# Patient Record
Sex: FEMALE | Race: BLACK OR AFRICAN AMERICAN | Employment: UNEMPLOYED | URBAN - METROPOLITAN AREA
[De-identification: names, ages, dates, MRNs, and addresses within clinical notes are randomized per-mention and may not be internally consistent; named-entity substitution may affect disease eponyms.]

---

## 2024-10-03 ENCOUNTER — HOSPITAL ENCOUNTER (INPATIENT)
Age: 39
LOS: 7 days | Discharge: HOME HEALTH | DRG: 060 | End: 2024-10-10
Attending: EMERGENCY MEDICINE | Admitting: HOSPITALIST
Payer: MEDICAID

## 2024-10-03 ENCOUNTER — APPOINTMENT (OUTPATIENT)
Dept: MRI IMAGING | Age: 39
DRG: 060 | End: 2024-10-03
Payer: MEDICAID

## 2024-10-03 ENCOUNTER — APPOINTMENT (OUTPATIENT)
Dept: GENERAL RADIOLOGY | Age: 39
DRG: 060 | End: 2024-10-03
Payer: MEDICAID

## 2024-10-03 ENCOUNTER — APPOINTMENT (OUTPATIENT)
Dept: INTERVENTIONAL RADIOLOGY/VASCULAR | Age: 39
DRG: 060 | End: 2024-10-03
Payer: MEDICAID

## 2024-10-03 DIAGNOSIS — R29.898 BILATERAL LEG WEAKNESS: Primary | ICD-10-CM

## 2024-10-03 DIAGNOSIS — G37.3 TRANSVERSE MYELITIS (HCC): ICD-10-CM

## 2024-10-03 DIAGNOSIS — R93.7 ABNORMAL MRI, SPINE: ICD-10-CM

## 2024-10-03 PROBLEM — R33.8 ACUTE URINARY RETENTION: Status: ACTIVE | Noted: 2024-10-03

## 2024-10-03 LAB
ALBUMIN SERPL-MCNC: 4.7 G/DL (ref 3.4–5)
ALBUMIN/GLOB SERPL: 1.3 {RATIO} (ref 1.1–2.2)
ALP SERPL-CCNC: 78 U/L (ref 40–129)
ALT SERPL-CCNC: 33 U/L (ref 10–40)
AMPHETAMINES UR QL SCN>1000 NG/ML: NORMAL
ANION GAP SERPL CALCULATED.3IONS-SCNC: 16 MMOL/L (ref 3–16)
APPEARANCE CSF: CLEAR
AST SERPL-CCNC: 29 U/L (ref 15–37)
BACTERIA URNS QL MICRO: NORMAL /HPF
BARBITURATES UR QL SCN>200 NG/ML: NORMAL
BASOPHILS # BLD: 0 K/UL (ref 0–0.2)
BASOPHILS NFR BLD: 0.7 %
BENZODIAZ UR QL SCN>200 NG/ML: NORMAL
BILIRUB SERPL-MCNC: 0.5 MG/DL (ref 0–1)
BILIRUB UR QL STRIP.AUTO: NEGATIVE
BUN SERPL-MCNC: 10 MG/DL (ref 7–20)
CALCIUM SERPL-MCNC: 9.8 MG/DL (ref 8.3–10.6)
CANNABINOIDS UR QL SCN>50 NG/ML: NORMAL
CHLORIDE SERPL-SCNC: 103 MMOL/L (ref 99–110)
CLARITY UR: CLEAR
CO2 SERPL-SCNC: 21 MMOL/L (ref 21–32)
COCAINE UR QL SCN: NORMAL
COLOR UR: ABNORMAL
CREAT SERPL-MCNC: <0.5 MG/DL (ref 0.6–1.1)
CRP SERPL-MCNC: <3 MG/L (ref 0–5.1)
DEPRECATED RDW RBC AUTO: 13.1 % (ref 12.4–15.4)
DRUG SCREEN COMMENT UR-IMP: NORMAL
EOSINOPHIL # BLD: 0.1 K/UL (ref 0–0.6)
EOSINOPHIL NFR BLD: 1.4 %
EPI CELLS #/AREA URNS AUTO: 2 /HPF (ref 0–5)
ERYTHROCYTE [SEDIMENTATION RATE] IN BLOOD BY WESTERGREN METHOD: 26 MM/HR (ref 0–20)
FENTANYL SCREEN, URINE: NORMAL
GFR SERPLBLD CREATININE-BSD FMLA CKD-EPI: >90 ML/MIN/{1.73_M2}
GLUCOSE CSF-MCNC: 44 MG/DL (ref 40–80)
GLUCOSE SERPL-MCNC: 103 MG/DL (ref 70–99)
GLUCOSE UR STRIP.AUTO-MCNC: NEGATIVE MG/DL
HCG SERPL QL: NEGATIVE
HCT VFR BLD AUTO: 43.5 % (ref 36–48)
HGB BLD-MCNC: 14.6 G/DL (ref 12–16)
HGB UR QL STRIP.AUTO: NEGATIVE
HYALINE CASTS #/AREA URNS AUTO: 0 /LPF (ref 0–8)
INR PPP: 1.02 (ref 0.85–1.15)
KETONES UR STRIP.AUTO-MCNC: ABNORMAL MG/DL
LACTATE BLDV-SCNC: 1 MMOL/L (ref 0.4–1.9)
LEUKOCYTE ESTERASE UR QL STRIP.AUTO: ABNORMAL
LYMPHOCYTES # BLD: 1.8 K/UL (ref 1–5.1)
LYMPHOCYTES NFR BLD: 29.5 %
MCH RBC QN AUTO: 30.8 PG (ref 26–34)
MCHC RBC AUTO-ENTMCNC: 33.6 G/DL (ref 31–36)
MCV RBC AUTO: 91.6 FL (ref 80–100)
METHADONE UR QL SCN>300 NG/ML: NORMAL
MONOCYTES # BLD: 0.5 K/UL (ref 0–1.3)
MONOCYTES NFR BLD: 8.5 %
NEUTROPHILS # BLD: 3.7 K/UL (ref 1.7–7.7)
NEUTROPHILS NFR BLD: 59.9 %
NITRITE UR QL STRIP.AUTO: NEGATIVE
OPIATES UR QL SCN>300 NG/ML: NORMAL
OXYCODONE UR QL SCN: NORMAL
PCP UR QL SCN>25 NG/ML: NORMAL
PH UR STRIP.AUTO: 7 [PH] (ref 5–8)
PH UR STRIP: 7 [PH]
PLATELET # BLD AUTO: 213 K/UL (ref 135–450)
PMV BLD AUTO: 9.2 FL (ref 5–10.5)
POTASSIUM SERPL-SCNC: 3.7 MMOL/L (ref 3.5–5.1)
PROT CSF-MCNC: 86 MG/DL (ref 15–45)
PROT SERPL-MCNC: 8.2 G/DL (ref 6.4–8.2)
PROT UR STRIP.AUTO-MCNC: NEGATIVE MG/DL
PROTHROMBIN TIME: 13.6 SEC (ref 11.9–14.9)
RBC # BLD AUTO: 4.75 M/UL (ref 4–5.2)
RBC CLUMPS #/AREA URNS AUTO: 4 /HPF (ref 0–4)
SODIUM SERPL-SCNC: 140 MMOL/L (ref 136–145)
SP GR UR STRIP.AUTO: 1.02 (ref 1–1.03)
TROPONIN, HIGH SENSITIVITY: <6 NG/L (ref 0–14)
TROPONIN, HIGH SENSITIVITY: <6 NG/L (ref 0–14)
TSH SERPL DL<=0.005 MIU/L-ACNC: 1.21 UIU/ML (ref 0.27–4.2)
TUBE # CSF: NORMAL
UA COMPLETE W REFLEX CULTURE PNL UR: ABNORMAL
UA DIPSTICK W REFLEX MICRO PNL UR: YES
URN SPEC COLLECT METH UR: ABNORMAL
UROBILINOGEN UR STRIP-ACNC: 1 E.U./DL
WBC # BLD AUTO: 6.1 K/UL (ref 4–11)
WBC #/AREA URNS AUTO: 2 /HPF (ref 0–5)

## 2024-10-03 PROCEDURE — 86140 C-REACTIVE PROTEIN: CPT

## 2024-10-03 PROCEDURE — 87205 SMEAR GRAM STAIN: CPT

## 2024-10-03 PROCEDURE — 70551 MRI BRAIN STEM W/O DYE: CPT

## 2024-10-03 PROCEDURE — 88112 CYTOPATH CELL ENHANCE TECH: CPT

## 2024-10-03 PROCEDURE — 87070 CULTURE OTHR SPECIMN AEROBIC: CPT

## 2024-10-03 PROCEDURE — 83916 OLIGOCLONAL BANDS: CPT

## 2024-10-03 PROCEDURE — APPSS60 APP SPLIT SHARED TIME 46-60 MINUTES

## 2024-10-03 PROCEDURE — 85610 PROTHROMBIN TIME: CPT

## 2024-10-03 PROCEDURE — 89050 BODY FLUID CELL COUNT: CPT

## 2024-10-03 PROCEDURE — 82784 ASSAY IGA/IGD/IGG/IGM EACH: CPT

## 2024-10-03 PROCEDURE — 87390 HIV-1 AG IA: CPT

## 2024-10-03 PROCEDURE — 84703 CHORIONIC GONADOTROPIN ASSAY: CPT

## 2024-10-03 PROCEDURE — 84165 PROTEIN E-PHORESIS SERUM: CPT

## 2024-10-03 PROCEDURE — 86701 HIV-1ANTIBODY: CPT

## 2024-10-03 PROCEDURE — 6370000000 HC RX 637 (ALT 250 FOR IP): Performed by: NEUROMUSCULOSKELETAL MEDICINE & OMM

## 2024-10-03 PROCEDURE — 71045 X-RAY EXAM CHEST 1 VIEW: CPT

## 2024-10-03 PROCEDURE — 86362 MOG-IGG1 ANTB CBA EACH: CPT

## 2024-10-03 PROCEDURE — 85025 COMPLETE CBC W/AUTO DIFF WBC: CPT

## 2024-10-03 PROCEDURE — 51702 INSERT TEMP BLADDER CATH: CPT

## 2024-10-03 PROCEDURE — 6370000000 HC RX 637 (ALT 250 FOR IP): Performed by: HOSPITALIST

## 2024-10-03 PROCEDURE — 009U3ZX DRAINAGE OF SPINAL CANAL, PERCUTANEOUS APPROACH, DIAGNOSTIC: ICD-10-PCS | Performed by: INTERNAL MEDICINE

## 2024-10-03 PROCEDURE — 84443 ASSAY THYROID STIM HORMONE: CPT

## 2024-10-03 PROCEDURE — 2500000003 HC RX 250 WO HCPCS: Performed by: HOSPITALIST

## 2024-10-03 PROCEDURE — 86052 AQUAPORIN-4 ANTB CBA EACH: CPT

## 2024-10-03 PROCEDURE — 80307 DRUG TEST PRSMV CHEM ANLYZR: CPT

## 2024-10-03 PROCEDURE — 72146 MRI CHEST SPINE W/O DYE: CPT

## 2024-10-03 PROCEDURE — 83605 ASSAY OF LACTIC ACID: CPT

## 2024-10-03 PROCEDURE — 93005 ELECTROCARDIOGRAM TRACING: CPT | Performed by: EMERGENCY MEDICINE

## 2024-10-03 PROCEDURE — 82607 VITAMIN B-12: CPT

## 2024-10-03 PROCEDURE — 84155 ASSAY OF PROTEIN SERUM: CPT

## 2024-10-03 PROCEDURE — 80053 COMPREHEN METABOLIC PANEL: CPT

## 2024-10-03 PROCEDURE — 84484 ASSAY OF TROPONIN QUANT: CPT

## 2024-10-03 PROCEDURE — 72148 MRI LUMBAR SPINE W/O DYE: CPT

## 2024-10-03 PROCEDURE — 81001 URINALYSIS AUTO W/SCOPE: CPT

## 2024-10-03 PROCEDURE — 82040 ASSAY OF SERUM ALBUMIN: CPT

## 2024-10-03 PROCEDURE — 2580000003 HC RX 258: Performed by: HOSPITALIST

## 2024-10-03 PROCEDURE — 82746 ASSAY OF FOLIC ACID SERUM: CPT

## 2024-10-03 PROCEDURE — 86702 HIV-2 ANTIBODY: CPT

## 2024-10-03 PROCEDURE — 82306 VITAMIN D 25 HYDROXY: CPT

## 2024-10-03 PROCEDURE — 86617 LYME DISEASE ANTIBODY: CPT

## 2024-10-03 PROCEDURE — 99285 EMERGENCY DEPT VISIT HI MDM: CPT

## 2024-10-03 PROCEDURE — 86780 TREPONEMA PALLIDUM: CPT

## 2024-10-03 PROCEDURE — 62328 DX LMBR SPI PNXR W/FLUOR/CT: CPT

## 2024-10-03 PROCEDURE — 1200000000 HC SEMI PRIVATE

## 2024-10-03 PROCEDURE — 85652 RBC SED RATE AUTOMATED: CPT

## 2024-10-03 PROCEDURE — 99222 1ST HOSP IP/OBS MODERATE 55: CPT | Performed by: NEUROMUSCULOSKELETAL MEDICINE & OMM

## 2024-10-03 PROCEDURE — 84157 ASSAY OF PROTEIN OTHER: CPT

## 2024-10-03 PROCEDURE — 82042 OTHER SOURCE ALBUMIN QUAN EA: CPT

## 2024-10-03 PROCEDURE — 2580000003 HC RX 258

## 2024-10-03 PROCEDURE — 72141 MRI NECK SPINE W/O DYE: CPT

## 2024-10-03 PROCEDURE — APPNB30 APP NON BILLABLE TIME 0-30 MINS

## 2024-10-03 PROCEDURE — 6360000002 HC RX W HCPCS

## 2024-10-03 PROCEDURE — 82945 GLUCOSE OTHER FLUID: CPT

## 2024-10-03 RX ORDER — POLYETHYLENE GLYCOL 3350 17 G/17G
17 POWDER, FOR SOLUTION ORAL DAILY PRN
Status: DISCONTINUED | OUTPATIENT
Start: 2024-10-03 | End: 2024-10-10 | Stop reason: HOSPADM

## 2024-10-03 RX ORDER — ONDANSETRON 2 MG/ML
4 INJECTION INTRAMUSCULAR; INTRAVENOUS EVERY 6 HOURS PRN
Status: DISCONTINUED | OUTPATIENT
Start: 2024-10-03 | End: 2024-10-10 | Stop reason: HOSPADM

## 2024-10-03 RX ORDER — POTASSIUM CHLORIDE 1500 MG/1
40 TABLET, EXTENDED RELEASE ORAL PRN
Status: DISCONTINUED | OUTPATIENT
Start: 2024-10-03 | End: 2024-10-10 | Stop reason: HOSPADM

## 2024-10-03 RX ORDER — ACETAMINOPHEN 325 MG/1
650 TABLET ORAL EVERY 6 HOURS PRN
Status: DISCONTINUED | OUTPATIENT
Start: 2024-10-03 | End: 2024-10-10 | Stop reason: HOSPADM

## 2024-10-03 RX ORDER — PANTOPRAZOLE SODIUM 40 MG/1
40 TABLET, DELAYED RELEASE ORAL
Status: DISCONTINUED | OUTPATIENT
Start: 2024-10-03 | End: 2024-10-10 | Stop reason: HOSPADM

## 2024-10-03 RX ORDER — ONDANSETRON 4 MG/1
4 TABLET, ORALLY DISINTEGRATING ORAL EVERY 8 HOURS PRN
Status: DISCONTINUED | OUTPATIENT
Start: 2024-10-03 | End: 2024-10-10 | Stop reason: HOSPADM

## 2024-10-03 RX ORDER — SODIUM CHLORIDE 0.9 % (FLUSH) 0.9 %
5-40 SYRINGE (ML) INJECTION EVERY 12 HOURS SCHEDULED
Status: DISCONTINUED | OUTPATIENT
Start: 2024-10-03 | End: 2024-10-10 | Stop reason: HOSPADM

## 2024-10-03 RX ORDER — POTASSIUM CHLORIDE 7.45 MG/ML
10 INJECTION INTRAVENOUS PRN
Status: DISCONTINUED | OUTPATIENT
Start: 2024-10-03 | End: 2024-10-10 | Stop reason: HOSPADM

## 2024-10-03 RX ORDER — LIDOCAINE HYDROCHLORIDE 10 MG/ML
10 INJECTION, SOLUTION EPIDURAL; INFILTRATION; INTRACAUDAL; PERINEURAL ONCE
Status: COMPLETED | OUTPATIENT
Start: 2024-10-03 | End: 2024-10-03

## 2024-10-03 RX ORDER — MAGNESIUM SULFATE IN WATER 40 MG/ML
2000 INJECTION, SOLUTION INTRAVENOUS PRN
Status: DISCONTINUED | OUTPATIENT
Start: 2024-10-03 | End: 2024-10-10 | Stop reason: HOSPADM

## 2024-10-03 RX ORDER — SODIUM CHLORIDE 9 MG/ML
INJECTION, SOLUTION INTRAVENOUS PRN
Status: DISCONTINUED | OUTPATIENT
Start: 2024-10-03 | End: 2024-10-10 | Stop reason: HOSPADM

## 2024-10-03 RX ORDER — ACETAMINOPHEN 650 MG/1
650 SUPPOSITORY RECTAL EVERY 6 HOURS PRN
Status: DISCONTINUED | OUTPATIENT
Start: 2024-10-03 | End: 2024-10-10 | Stop reason: HOSPADM

## 2024-10-03 RX ORDER — SODIUM CHLORIDE 0.9 % (FLUSH) 0.9 %
5-40 SYRINGE (ML) INJECTION PRN
Status: DISCONTINUED | OUTPATIENT
Start: 2024-10-03 | End: 2024-10-10 | Stop reason: HOSPADM

## 2024-10-03 RX ADMIN — Medication 10 ML: at 20:53

## 2024-10-03 RX ADMIN — ACETAMINOPHEN 650 MG: 325 TABLET ORAL at 16:36

## 2024-10-03 RX ADMIN — PANTOPRAZOLE SODIUM 40 MG: 40 TABLET, DELAYED RELEASE ORAL at 16:37

## 2024-10-03 RX ADMIN — LIDOCAINE HYDROCHLORIDE 10 ML: 10 INJECTION, SOLUTION EPIDURAL; INFILTRATION; INTRACAUDAL; PERINEURAL at 15:03

## 2024-10-03 RX ADMIN — METHYLPREDNISOLONE SODIUM SUCCINATE 1000 MG: 1 INJECTION INTRAMUSCULAR; INTRAVENOUS at 13:38

## 2024-10-03 ASSESSMENT — PAIN SCALES - GENERAL
PAINLEVEL_OUTOF10: 2
PAINLEVEL_OUTOF10: 6
PAINLEVEL_OUTOF10: 2

## 2024-10-03 ASSESSMENT — PAIN DESCRIPTION - LOCATION
LOCATION: HEAD
LOCATION: HEAD

## 2024-10-03 ASSESSMENT — PAIN - FUNCTIONAL ASSESSMENT: PAIN_FUNCTIONAL_ASSESSMENT: 0-10

## 2024-10-03 ASSESSMENT — LIFESTYLE VARIABLES
HOW OFTEN DO YOU HAVE A DRINK CONTAINING ALCOHOL: NEVER
HOW MANY STANDARD DRINKS CONTAINING ALCOHOL DO YOU HAVE ON A TYPICAL DAY: PATIENT DOES NOT DRINK

## 2024-10-03 ASSESSMENT — PAIN DESCRIPTION - DESCRIPTORS: DESCRIPTORS: ACHING

## 2024-10-03 NOTE — ED NOTES
[id number].  Nephew speaks Australian. Pt speaks Perfecto.  No  available for language Perfecto.  Numbness marlon legs and waist for 1 week. Weakness started 2 weeks ago. Pt able to hold marlon legs up slightly and wiggle toes.    Patient alert and oriented x4.  GCS 15/15.  Skin appropriate for ethnicity, dry and intact.  No signs of acute distress noted at this time. Regular respiratory pattern, normal respiratory depth, unlabored respirations.   denies pain.    Patient placed on a continuous pulse oximetry and telemetry monitoring. Bedside Monitor on with Alarms audible and alarms set.  Patient on cycling blood pressure.     Patient oriented to room and ED throughput process.  Patient educated on all orders, including any medications.  Patient educated on chief complaint/symptoms. Patient encouraged to ask questions regarding care, medications or treatment plan.  Patient aware of how to reach staff with questions/concerns.  Safety measures and Fall risk precautions in place, ED bed locked in lowest position, bed side table within reach, and call light in reach.  Plan of care ongoing.  Patient expresses no other needs at this time.

## 2024-10-03 NOTE — ED NOTES
Pt back from MRI at this time, placed back on telemetry monitoring at this time and repeat troponin sent.

## 2024-10-03 NOTE — ED NOTES
MRI questionnaire filled out with  [id number].  Nephew speaks Kiswahili. Pt speaks Perfecto.  No  available for language Perfecto.

## 2024-10-03 NOTE — H&P
confusion,dysarthria.  Skin: Negative for itching,rash, good capillary refill.   Psychiatric: Negative for depression,anxiety, agitation.  Endocrine: Negative for polydipsia,polyuria,heat /cold intolerance.    Past Medical History:   has no past medical history on file.     Past Surgical History:   has no past surgical history on file.     Medications:  No current facility-administered medications on file prior to encounter.     No current outpatient medications on file prior to encounter.       Allergies:  No Known Allergies     Social History:   reports that she has never smoked. She has never used smokeless tobacco. She reports that she does not drink alcohol and does not use drugs.     Family History:  family history is not on file. ,     Physical Exam:  /73   Pulse 71   Temp 98.8 °F (37.1 °C) (Oral)   Resp 16   Ht 1.524 m (5')   Wt 56.7 kg (125 lb)   SpO2 95%   BMI 24.41 kg/m²     General appearance:  Appears comfortable. Well nourished  Eyes: Sclera clear, pupils equal  ENT: Moist mucus membranes, no thrush. Trachea midline.  Cardiovascular: Regular rhythm, normal S1, S2. No murmur, gallop, rub. No edema in lower extremities  Respiratory: Clear to auscultation bilaterally, no wheeze, good inspiratory effort  Gastrointestinal: Abdomen soft, non-tender, not distended, normal bowel sounds  Musculoskeletal: No cyanosis in digits, neck supple  Neurology: Cranial nerves grossly intact. Alert and oriented in time, place and person.  Muscle strength 3 out of 5 bilateral lower extremity.  More left than right.  Psychiatry: Appropriate affect. Not agitated  Skin: Warm, dry, normal turgor, no rash    Labs:  CBC:   Lab Results   Component Value Date/Time    WBC 6.1 10/03/2024 07:36 AM    RBC 4.75 10/03/2024 07:36 AM    HGB 14.6 10/03/2024 07:36 AM    HCT 43.5 10/03/2024 07:36 AM    MCV 91.6 10/03/2024 07:36 AM    MCH 30.8 10/03/2024 07:36 AM    MCHC 33.6 10/03/2024 07:36 AM    RDW 13.1 10/03/2024 07:36 AM

## 2024-10-03 NOTE — CARE COORDINATION
Discharge Planning Note:    Chart reviewed and it appears that patient has minimal needs for discharge at this time. Risk Score 8 %     Patient is from home with . Patient does not have insurance and does not have a PCP listed.  Patient will need meds to bed and an appointment with  Clinic.    CM did order a financial consult for patient.     Please notify case management if any discharge needs are identified.      Case management will continue to follow progress and update discharge plan as needed.    Electronically signed by ELI Rai on 10/3/2024 at 4:56 PM

## 2024-10-03 NOTE — ED NOTES
How does patient ambulate?   []Low Fall Risk (ambulates by themselves without support)  []Stand by assist   []Contact Guard   []Front wheel walker  []Wheelchair   []Steady  [x]Bed bound  []History of Lower Extremity Amputation  []Unknown, did not assess in the emergency department   How does patient take pills?  [x]Whole with Water  []Crushed in applesauce  []Crushed in pudding  []Other  []Unknown no oral medications were given in the ED  Is patient alert?   [x]Alert  []Drowsy but responds to voice  []Doesn't respond to voice but responds to painful stimuli  []Unresponsive  Is patient oriented?   [x]To person  [x]To place  [x]To time  [x]To situation  []Confused  []Agitated  [x]Follows commands  If patient is disoriented or from a Skill Nursing Facility has family been notified of admission?   []Yes   [x]No  Patient belongings?   [x]Cell phone  []Wallet   []Dentures  [x]Clothing  Any specific patient or family belongings/needs/dynamics?   na  Miscellaneous comments/pending orders?  na     If there are any additional questions please reach out to the Emergency Department. Call [phone]

## 2024-10-03 NOTE — ED PROVIDER NOTES
MRI of the brain.         XR CHEST PORTABLE   Final Result   1.  No acute abnormality.           ED COURSE:    Medications   methylPREDNISolone sodium (SOLU-MEDROL) 1,000 mg in sodium chloride 0.9 % 250 mL IVPB (has no administration in time range)     Vitals:    10/03/24 0830 10/03/24 0840 10/03/24 1000 10/03/24 1230   BP: 117/75 110/71 107/73 128/68   Pulse: 77 78 71 74   Resp: 15 16 16 19   Temp:       TempSrc:       SpO2: 96% 96% 95%    Weight:       Height:         History from:  Patient  Limitations to history:  dialect not available, language  Chronic Conditions:  has no past medical history on file.  Records Reviewed:  Outpatient notes  Disposition Considerations (Tests not ordered but considered, Shared Decision Making, Pt Expectation of Test or Tx.):  u/s imaging not deemed clinically necessary in the ED setting    PROCEDURES:  None    CRITICAL CARE:  Total critical care time of 31 minutes (excludes any time for procedures).  This includes but not limited to vital sign monitoring, medication and/or clinical response to medications, interpretation of diagnostics, review of nursing notes, pertinent record review, discussions about patient condition, consultation time, documentation time.  Critical care due to the patient's leg weakness.    CONSULTATIONS:  Hospitalist, Neurology Dr Sonny Childs is a 39 y.o. female who presented because of leg weakness, numbness.  Her MRI does show abnormal enhancement of the thoracic spine and upper area concerning for demyelinating process or transverse myelitis.  I consulted with neurology.  He recommends high-dose steroid treatment and he will add additional testing to further investigate the patient's condition.  Imaging does not suggest that she has suffered from a stroke or has a space-occupying lesion at this point in time.  Communication is difficult but possible through family members as she speaks an unusual dialect that does not always have

## 2024-10-04 ENCOUNTER — APPOINTMENT (OUTPATIENT)
Dept: MRI IMAGING | Age: 39
DRG: 060 | End: 2024-10-04
Payer: MEDICAID

## 2024-10-04 LAB
25(OH)D3 SERPL-MCNC: 11.1 NG/ML
ALBUMIN CSF-MCNC: 44.4 MG/DL (ref 10–30)
ALBUMIN SERPL-MCNC: 4478 MG/DL (ref 3400–5000)
APPEARANCE CSF: CLEAR
CLOT EVALUATION CSF: ABNORMAL
COLOR CSF: COLORLESS
EKG ATRIAL RATE: 83 BPM
EKG DIAGNOSIS: NORMAL
EKG P AXIS: 43 DEGREES
EKG P-R INTERVAL: 128 MS
EKG Q-T INTERVAL: 376 MS
EKG QRS DURATION: 90 MS
EKG QTC CALCULATION (BAZETT): 441 MS
EKG R AXIS: 85 DEGREES
EKG T AXIS: 41 DEGREES
EKG VENTRICULAR RATE: 83 BPM
FOLATE SERPL-MCNC: 15.4 NG/ML (ref 4.78–24.2)
HIV 1+2 AB+HIV1 P24 AG SERPL QL IA: NORMAL
HIV 2 AB SERPL QL IA: NORMAL
HIV1 AB SERPL QL IA: NORMAL
HIV1 P24 AG SERPL QL IA: NORMAL
IGG CSF-MCNC: 7.77 MG/DL (ref 0–6)
IGG CSF/SERPL: 0.55 {RATIO} (ref 0.2–0.7)
IGG SERPL-MCNC: 1416 MG/DL (ref 700–1600)
IGG SYNTH RATE SER+CSF CALC-MRATE: 2.7 MG/DAY (ref -9.9–3.3)
MANUAL DIF COMMENT CSF-IMP: ABNORMAL
NUC CELL # FLD MANUAL: 7 /CUMM (ref 0–5)
RBC # FLD MANUAL: 9 /CUMM
REAGIN+T PALLIDUM IGG+IGM SERPL-IMP: NORMAL
TUBE # CSF: ABNORMAL
VIT B12 SERPL-MCNC: 2000 PG/ML (ref 211–911)

## 2024-10-04 PROCEDURE — 2580000003 HC RX 258: Performed by: HOSPITALIST

## 2024-10-04 PROCEDURE — 1200000000 HC SEMI PRIVATE

## 2024-10-04 PROCEDURE — 97535 SELF CARE MNGMENT TRAINING: CPT

## 2024-10-04 PROCEDURE — 99231 SBSQ HOSP IP/OBS SF/LOW 25: CPT | Performed by: NEUROMUSCULOSKELETAL MEDICINE & OMM

## 2024-10-04 PROCEDURE — 6370000000 HC RX 637 (ALT 250 FOR IP): Performed by: NEUROMUSCULOSKELETAL MEDICINE & OMM

## 2024-10-04 PROCEDURE — 6360000002 HC RX W HCPCS: Performed by: HOSPITALIST

## 2024-10-04 PROCEDURE — 72147 MRI CHEST SPINE W/DYE: CPT

## 2024-10-04 PROCEDURE — APPNB30 APP NON BILLABLE TIME 0-30 MINS

## 2024-10-04 PROCEDURE — 6370000000 HC RX 637 (ALT 250 FOR IP): Performed by: HOSPITALIST

## 2024-10-04 PROCEDURE — 97166 OT EVAL MOD COMPLEX 45 MIN: CPT

## 2024-10-04 PROCEDURE — A9577 INJ MULTIHANCE: HCPCS

## 2024-10-04 PROCEDURE — 97530 THERAPEUTIC ACTIVITIES: CPT

## 2024-10-04 PROCEDURE — 6360000004 HC RX CONTRAST MEDICATION

## 2024-10-04 PROCEDURE — 72142 MRI NECK SPINE W/DYE: CPT

## 2024-10-04 PROCEDURE — APPSS30 APP SPLIT SHARED TIME 16-30 MINUTES

## 2024-10-04 PROCEDURE — 93010 ELECTROCARDIOGRAM REPORT: CPT | Performed by: INTERNAL MEDICINE

## 2024-10-04 PROCEDURE — 2580000003 HC RX 258

## 2024-10-04 PROCEDURE — 97162 PT EVAL MOD COMPLEX 30 MIN: CPT

## 2024-10-04 RX ORDER — ERGOCALCIFEROL 1.25 MG/1
50000 CAPSULE, LIQUID FILLED ORAL WEEKLY
Status: DISCONTINUED | OUTPATIENT
Start: 2024-10-04 | End: 2024-10-10 | Stop reason: HOSPADM

## 2024-10-04 RX ORDER — SODIUM CHLORIDE 0.9 % (FLUSH) 0.9 %
10 SYRINGE (ML) INJECTION PRN
Status: DISCONTINUED | OUTPATIENT
Start: 2024-10-04 | End: 2024-10-10 | Stop reason: HOSPADM

## 2024-10-04 RX ADMIN — ACETAMINOPHEN 650 MG: 325 TABLET ORAL at 04:57

## 2024-10-04 RX ADMIN — GADOBENATE DIMEGLUMINE 11 ML: 529 INJECTION, SOLUTION INTRAVENOUS at 08:33

## 2024-10-04 RX ADMIN — ACETAMINOPHEN 650 MG: 325 TABLET ORAL at 10:39

## 2024-10-04 RX ADMIN — SODIUM CHLORIDE: 9 INJECTION, SOLUTION INTRAVENOUS at 10:26

## 2024-10-04 RX ADMIN — METHYLPREDNISOLONE SODIUM SUCCINATE 1000 MG: 1 INJECTION INTRAMUSCULAR; INTRAVENOUS at 10:37

## 2024-10-04 RX ADMIN — Medication 10 ML: at 10:23

## 2024-10-04 RX ADMIN — ERGOCALCIFEROL 50000 UNITS: 1.25 CAPSULE ORAL at 10:35

## 2024-10-04 RX ADMIN — Medication 10 ML: at 21:26

## 2024-10-04 RX ADMIN — SODIUM CHLORIDE, PRESERVATIVE FREE 10 ML: 5 INJECTION INTRAVENOUS at 08:33

## 2024-10-04 RX ADMIN — ACETAMINOPHEN 650 MG: 325 TABLET ORAL at 18:12

## 2024-10-04 RX ADMIN — PANTOPRAZOLE SODIUM 40 MG: 40 TABLET, DELAYED RELEASE ORAL at 06:54

## 2024-10-04 ASSESSMENT — PAIN DESCRIPTION - DESCRIPTORS
DESCRIPTORS: ACHING
DESCRIPTORS: ACHING
DESCRIPTORS: CRAMPING

## 2024-10-04 ASSESSMENT — PAIN SCALES - GENERAL
PAINLEVEL_OUTOF10: 6
PAINLEVEL_OUTOF10: 0
PAINLEVEL_OUTOF10: 6
PAINLEVEL_OUTOF10: 3
PAINLEVEL_OUTOF10: 8
PAINLEVEL_OUTOF10: 6

## 2024-10-04 ASSESSMENT — PAIN - FUNCTIONAL ASSESSMENT
PAIN_FUNCTIONAL_ASSESSMENT: PREVENTS OR INTERFERES WITH ALL ACTIVE AND SOME PASSIVE ACTIVITIES
PAIN_FUNCTIONAL_ASSESSMENT: ACTIVITIES ARE NOT PREVENTED
PAIN_FUNCTIONAL_ASSESSMENT: PREVENTS OR INTERFERES SOME ACTIVE ACTIVITIES AND ADLS

## 2024-10-04 ASSESSMENT — PAIN DESCRIPTION - PAIN TYPE: TYPE: ACUTE PAIN

## 2024-10-04 ASSESSMENT — PAIN DESCRIPTION - ORIENTATION
ORIENTATION: LEFT
ORIENTATION: MID
ORIENTATION: RIGHT;LEFT

## 2024-10-04 ASSESSMENT — PAIN DESCRIPTION - ONSET
ONSET: ON-GOING
ONSET: ON-GOING

## 2024-10-04 ASSESSMENT — PAIN DESCRIPTION - LOCATION
LOCATION: LEG
LOCATION: LEG
LOCATION: BACK

## 2024-10-04 ASSESSMENT — PAIN DESCRIPTION - FREQUENCY
FREQUENCY: CONTINUOUS
FREQUENCY: CONTINUOUS

## 2024-10-04 ASSESSMENT — PAIN DESCRIPTION - DIRECTION: RADIATING_TOWARDS: BILATERAL LEGS

## 2024-10-04 ASSESSMENT — PAIN SCALES - WONG BAKER
WONGBAKER_NUMERICALRESPONSE: HURTS EVEN MORE
WONGBAKER_NUMERICALRESPONSE: HURTS EVEN MORE

## 2024-10-04 NOTE — FLOWSHEET NOTE
Post procedure chart reviewed. Please call IR with any questions/concerns r/t lumbar puncture on 10/4/24

## 2024-10-04 NOTE — PLAN OF CARE
Problem: Pain  Goal: Verbalizes/displays adequate comfort level or baseline comfort level  Outcome: Progressing   Patient denies pain at this time.       Problem: Safety - Adult  Goal: Free from fall injury  Outcome: Progressing   Patient has remained free of falls. 2/4 bed rails up, bed locked and in lowest position, call light within reach. Patient instructed on use of call light and uses appropriately. Bed alarm on. Non-skid footwear and fall band on.

## 2024-10-04 NOTE — PLAN OF CARE
Problem: Discharge Planning  Goal: Discharge to home or other facility with appropriate resources  Outcome: Progressing     Problem: Pain  Goal: Verbalizes/displays adequate comfort level or baseline comfort level  10/4/2024 1242 by Sally Chavez RN  Outcome: Progressing  10/4/2024 0109 by Anat Katz, RN  Outcome: Progressing     Problem: Skin/Tissue Integrity  Goal: Absence of new skin breakdown  Description: 1.  Monitor for areas of redness and/or skin breakdown  2.  Assess vascular access sites hourly  3.  Every 4-6 hours minimum:  Change oxygen saturation probe site  4.  Every 4-6 hours:  If on nasal continuous positive airway pressure, respiratory therapy assess nares and determine need for appliance change or resting period.  Outcome: Progressing     Problem: Safety - Adult  Goal: Free from fall injury  10/4/2024 1242 by Sally Chavez RN  Outcome: Progressing  10/4/2024 0109 by Anat Katz, RN  Outcome: Progressing

## 2024-10-05 LAB
B BURGDOR IGG CSF QL IB: NEGATIVE
B BURGDOR IGM CSF QL IB: NEGATIVE

## 2024-10-05 PROCEDURE — 6370000000 HC RX 637 (ALT 250 FOR IP): Performed by: NEUROMUSCULOSKELETAL MEDICINE & OMM

## 2024-10-05 PROCEDURE — 6370000000 HC RX 637 (ALT 250 FOR IP): Performed by: HOSPITALIST

## 2024-10-05 PROCEDURE — 2580000003 HC RX 258: Performed by: HOSPITALIST

## 2024-10-05 PROCEDURE — 1200000000 HC SEMI PRIVATE

## 2024-10-05 PROCEDURE — 6360000002 HC RX W HCPCS: Performed by: HOSPITALIST

## 2024-10-05 RX ORDER — KETOROLAC TROMETHAMINE 15 MG/ML
15 INJECTION, SOLUTION INTRAMUSCULAR; INTRAVENOUS EVERY 6 HOURS PRN
Status: DISPENSED | OUTPATIENT
Start: 2024-10-05 | End: 2024-10-10

## 2024-10-05 RX ADMIN — KETOROLAC TROMETHAMINE 15 MG: 15 INJECTION, SOLUTION INTRAMUSCULAR; INTRAVENOUS at 17:38

## 2024-10-05 RX ADMIN — ACETAMINOPHEN 650 MG: 325 TABLET ORAL at 07:32

## 2024-10-05 RX ADMIN — Medication 10 ML: at 10:38

## 2024-10-05 RX ADMIN — METHYLPREDNISOLONE SODIUM SUCCINATE 1000 MG: 1 INJECTION INTRAMUSCULAR; INTRAVENOUS at 10:43

## 2024-10-05 RX ADMIN — Medication 10 ML: at 22:00

## 2024-10-05 RX ADMIN — PANTOPRAZOLE SODIUM 40 MG: 40 TABLET, DELAYED RELEASE ORAL at 05:51

## 2024-10-05 ASSESSMENT — PAIN DESCRIPTION - DESCRIPTORS: DESCRIPTORS: DISCOMFORT;ACHING

## 2024-10-05 ASSESSMENT — PAIN DESCRIPTION - ORIENTATION
ORIENTATION: LEFT;RIGHT
ORIENTATION: RIGHT;LEFT

## 2024-10-05 ASSESSMENT — PAIN SCALES - GENERAL
PAINLEVEL_OUTOF10: 5
PAINLEVEL_OUTOF10: 5
PAINLEVEL_OUTOF10: 2

## 2024-10-05 ASSESSMENT — PAIN DESCRIPTION - LOCATION
LOCATION: HIP
LOCATION: HIP;LEG

## 2024-10-05 NOTE — PLAN OF CARE
Problem: Discharge Planning  Goal: Discharge to home or other facility with appropriate resources  Outcome: Progressing     Problem: Pain  Goal: Verbalizes/displays adequate comfort level or baseline comfort level  Outcome: Progressing     Problem: Skin/Tissue Integrity  Goal: Absence of new skin breakdown  Description: 1.  Monitor for areas of redness and/or skin breakdown  2.  Assess vascular access sites hourly  3.  Every 4-6 hours minimum:  Change oxygen saturation probe site  4.  Every 4-6 hours:  If on nasal continuous positive airway pressure, respiratory therapy assess nares and determine need for appliance change or resting period.  Outcome: Progressing     Problem: Safety - Adult  Goal: Free from fall injury  Outcome: Progressing     Problem: Neurosensory - Adult  Goal: Achieves stable or improved neurological status  Outcome: Progressing     Problem: Genitourinary - Adult  Goal: Urinary catheter remains patent  Outcome: Progressing     Problem: Infection - Adult  Goal: Absence of infection at discharge  Outcome: Progressing  Goal: Absence of infection during hospitalization  Outcome: Progressing

## 2024-10-05 NOTE — PLAN OF CARE
Problem: Discharge Planning  Goal: Discharge to home or other facility with appropriate resources  10/5/2024 1244 by Stephanie Sheth RN  Outcome: Progressing  10/5/2024 0617 by Laura Sanon RN  Outcome: Progressing     Problem: Pain  Goal: Verbalizes/displays adequate comfort level or baseline comfort level  10/5/2024 1244 by Stephanie Sheth RN  Outcome: Progressing  10/5/2024 0617 by Laura Sanon RN  Outcome: Progressing     Problem: Skin/Tissue Integrity  Goal: Absence of new skin breakdown  Description: 1.  Monitor for areas of redness and/or skin breakdown  2.  Assess vascular access sites hourly  3.  Every 4-6 hours minimum:  Change oxygen saturation probe site  4.  Every 4-6 hours:  If on nasal continuous positive airway pressure, respiratory therapy assess nares and determine need for appliance change or resting period.  10/5/2024 1244 by Stephanie Sheth RN  Outcome: Progressing  10/5/2024 0617 by Laura Sanon RN  Outcome: Progressing     Problem: Safety - Adult  Goal: Free from fall injury  10/5/2024 1244 by Stephanie Sheth RN  Outcome: Progressing  10/5/2024 0617 by Laura Sanon RN  Outcome: Progressing     Problem: Neurosensory - Adult  Goal: Achieves stable or improved neurological status  10/5/2024 1244 by Stephanie Sheth RN  Outcome: Progressing  10/5/2024 0617 by Laura Sanon RN  Outcome: Progressing     Problem: Genitourinary - Adult  Goal: Urinary catheter remains patent  10/5/2024 1244 by Stephanie Sheth RN  Outcome: Progressing  10/5/2024 0617 by Laura Sanon RN  Outcome: Progressing     Problem: Infection - Adult  Goal: Absence of infection at discharge  10/5/2024 1244 by Stephanie Sheth RN  Outcome: Progressing  10/5/2024 0617 by Laura Sanon RN  Outcome: Progressing  Goal: Absence of infection during hospitalization  10/5/2024 1244 by Stephanie Sheth RN  Outcome: Progressing  10/5/2024 0617 by Laura Sanon RN  Outcome:

## 2024-10-06 PROCEDURE — 97530 THERAPEUTIC ACTIVITIES: CPT

## 2024-10-06 PROCEDURE — 2580000003 HC RX 258: Performed by: HOSPITALIST

## 2024-10-06 PROCEDURE — 97161 PT EVAL LOW COMPLEX 20 MIN: CPT

## 2024-10-06 PROCEDURE — 6370000000 HC RX 637 (ALT 250 FOR IP): Performed by: NEUROMUSCULOSKELETAL MEDICINE & OMM

## 2024-10-06 PROCEDURE — 97535 SELF CARE MNGMENT TRAINING: CPT

## 2024-10-06 PROCEDURE — 6360000002 HC RX W HCPCS: Performed by: HOSPITALIST

## 2024-10-06 PROCEDURE — 99232 SBSQ HOSP IP/OBS MODERATE 35: CPT | Performed by: NEUROMUSCULOSKELETAL MEDICINE & OMM

## 2024-10-06 PROCEDURE — 1200000000 HC SEMI PRIVATE

## 2024-10-06 RX ADMIN — PANTOPRAZOLE SODIUM 40 MG: 40 TABLET, DELAYED RELEASE ORAL at 06:10

## 2024-10-06 RX ADMIN — SODIUM CHLORIDE, PRESERVATIVE FREE 10 ML: 5 INJECTION INTRAVENOUS at 09:50

## 2024-10-06 RX ADMIN — Medication 10 ML: at 19:48

## 2024-10-06 RX ADMIN — Medication 10 ML: at 09:49

## 2024-10-06 RX ADMIN — KETOROLAC TROMETHAMINE 15 MG: 15 INJECTION, SOLUTION INTRAMUSCULAR; INTRAVENOUS at 09:49

## 2024-10-06 RX ADMIN — METHYLPREDNISOLONE SODIUM SUCCINATE 1000 MG: 1 INJECTION INTRAMUSCULAR; INTRAVENOUS at 09:51

## 2024-10-06 ASSESSMENT — PAIN SCALES - GENERAL
PAINLEVEL_OUTOF10: 4
PAINLEVEL_OUTOF10: 4

## 2024-10-06 ASSESSMENT — PAIN DESCRIPTION - ORIENTATION: ORIENTATION: RIGHT;LEFT

## 2024-10-06 ASSESSMENT — PAIN DESCRIPTION - DESCRIPTORS: DESCRIPTORS: ACHING

## 2024-10-06 ASSESSMENT — PAIN DESCRIPTION - LOCATION: LOCATION: HIP;LEG

## 2024-10-07 LAB
ALBUMIN SERPL ELPH-MCNC: 3.4 G/DL (ref 3.1–4.9)
ALPHA1 GLOB SERPL ELPH-MCNC: 0.3 G/DL (ref 0.2–0.4)
ALPHA2 GLOB SERPL ELPH-MCNC: 0.8 G/DL (ref 0.4–1.1)
ANION GAP SERPL CALCULATED.3IONS-SCNC: 16 MMOL/L (ref 3–16)
B-GLOBULIN SERPL ELPH-MCNC: 1.4 G/DL (ref 0.9–1.6)
BUN SERPL-MCNC: 14 MG/DL (ref 7–20)
CALCIUM SERPL-MCNC: 9 MG/DL (ref 8.3–10.6)
CHLORIDE SERPL-SCNC: 105 MMOL/L (ref 99–110)
CO2 SERPL-SCNC: 19 MMOL/L (ref 21–32)
CREAT SERPL-MCNC: <0.5 MG/DL (ref 0.6–1.1)
DEPRECATED RDW RBC AUTO: 13.4 % (ref 12.4–15.4)
GAMMA GLOB SERPL ELPH-MCNC: 1.5 G/DL (ref 0.6–1.8)
GFR SERPLBLD CREATININE-BSD FMLA CKD-EPI: >90 ML/MIN/{1.73_M2}
GLUCOSE SERPL-MCNC: 142 MG/DL (ref 70–99)
HCT VFR BLD AUTO: 41.3 % (ref 36–48)
HGB BLD-MCNC: 13.6 G/DL (ref 12–16)
MCH RBC QN AUTO: 30.2 PG (ref 26–34)
MCHC RBC AUTO-ENTMCNC: 32.9 G/DL (ref 31–36)
MCV RBC AUTO: 91.7 FL (ref 80–100)
MISCELLANEOUS LAB TEST ORDER: NORMAL
PLATELET # BLD AUTO: 204 K/UL (ref 135–450)
PMV BLD AUTO: 10 FL (ref 5–10.5)
POTASSIUM SERPL-SCNC: 3.5 MMOL/L (ref 3.5–5.1)
PROT SERPL-MCNC: 7.3 G/DL (ref 6.4–8.2)
RBC # BLD AUTO: 4.51 M/UL (ref 4–5.2)
SODIUM SERPL-SCNC: 140 MMOL/L (ref 136–145)
SPE/IFE INTERPRETATION: NORMAL
WBC # BLD AUTO: 11.5 K/UL (ref 4–11)

## 2024-10-07 PROCEDURE — 85027 COMPLETE CBC AUTOMATED: CPT

## 2024-10-07 PROCEDURE — 80048 BASIC METABOLIC PNL TOTAL CA: CPT

## 2024-10-07 PROCEDURE — 2580000003 HC RX 258: Performed by: HOSPITALIST

## 2024-10-07 PROCEDURE — 6360000002 HC RX W HCPCS: Performed by: HOSPITALIST

## 2024-10-07 PROCEDURE — 6370000000 HC RX 637 (ALT 250 FOR IP): Performed by: HOSPITALIST

## 2024-10-07 PROCEDURE — 97110 THERAPEUTIC EXERCISES: CPT

## 2024-10-07 PROCEDURE — 1200000000 HC SEMI PRIVATE

## 2024-10-07 PROCEDURE — 99232 SBSQ HOSP IP/OBS MODERATE 35: CPT

## 2024-10-07 PROCEDURE — 97530 THERAPEUTIC ACTIVITIES: CPT

## 2024-10-07 PROCEDURE — 97535 SELF CARE MNGMENT TRAINING: CPT

## 2024-10-07 PROCEDURE — 6370000000 HC RX 637 (ALT 250 FOR IP): Performed by: NEUROMUSCULOSKELETAL MEDICINE & OMM

## 2024-10-07 PROCEDURE — 97542 WHEELCHAIR MNGMENT TRAINING: CPT

## 2024-10-07 PROCEDURE — 51798 US URINE CAPACITY MEASURE: CPT

## 2024-10-07 PROCEDURE — 36415 COLL VENOUS BLD VENIPUNCTURE: CPT

## 2024-10-07 RX ADMIN — ACETAMINOPHEN 650 MG: 325 TABLET ORAL at 11:37

## 2024-10-07 RX ADMIN — POLYETHYLENE GLYCOL 3350 17 G: 17 POWDER, FOR SOLUTION ORAL at 05:31

## 2024-10-07 RX ADMIN — Medication 10 ML: at 21:13

## 2024-10-07 RX ADMIN — METHYLPREDNISOLONE SODIUM SUCCINATE 1000 MG: 1 INJECTION INTRAMUSCULAR; INTRAVENOUS at 07:55

## 2024-10-07 RX ADMIN — Medication 10 ML: at 07:56

## 2024-10-07 RX ADMIN — POTASSIUM CHLORIDE 40 MEQ: 1500 TABLET, EXTENDED RELEASE ORAL at 10:54

## 2024-10-07 RX ADMIN — PANTOPRAZOLE SODIUM 40 MG: 40 TABLET, DELAYED RELEASE ORAL at 05:31

## 2024-10-07 RX ADMIN — KETOROLAC TROMETHAMINE 15 MG: 15 INJECTION, SOLUTION INTRAMUSCULAR; INTRAVENOUS at 10:54

## 2024-10-07 ASSESSMENT — PAIN DESCRIPTION - ORIENTATION: ORIENTATION: RIGHT;LEFT

## 2024-10-07 ASSESSMENT — PAIN SCALES - WONG BAKER: WONGBAKER_NUMERICALRESPONSE: HURTS A LITTLE BIT

## 2024-10-07 ASSESSMENT — PAIN DESCRIPTION - DESCRIPTORS: DESCRIPTORS: ACHING

## 2024-10-07 ASSESSMENT — PAIN SCALES - GENERAL
PAINLEVEL_OUTOF10: 5
PAINLEVEL_OUTOF10: 2
PAINLEVEL_OUTOF10: 0
PAINLEVEL_OUTOF10: 3

## 2024-10-07 ASSESSMENT — PAIN DESCRIPTION - LOCATION
LOCATION: LEG
LOCATION: HEAD

## 2024-10-07 NOTE — PLAN OF CARE
Problem: Discharge Planning  Goal: Discharge to home or other facility with appropriate resources  10/7/2024 0758 by Sindy Saldivar RN  Flowsheets (Taken 10/7/2024 0758)  Discharge to home or other facility with appropriate resources:   Identify barriers to discharge with patient and caregiver   Identify discharge learning needs (meds, wound care, etc)   Arrange for needed discharge resources and transportation as appropriate     Problem: Pain  Goal: Verbalizes/displays adequate comfort level or baseline comfort level  10/7/2024 0758 by Sindy Saldivar RN  Flowsheets (Taken 10/7/2024 0758)  Verbalizes/displays adequate comfort level or baseline comfort level:   Encourage patient to monitor pain and request assistance   Assess pain using appropriate pain scale   Administer analgesics based on type and severity of pain and evaluate response     Problem: Safety - Adult  Goal: Free from fall injury  10/7/2024 0758 by Sindy Saldivar RN  Flowsheets (Taken 10/7/2024 0758)  Free From Fall Injury: Instruct family/caregiver on patient safety     Problem: Genitourinary - Adult  Goal: Urinary catheter remains patent  10/7/2024 0758 by Sindy Saldivar RN  Flowsheets (Taken 10/7/2024 0758)  Urinary catheter remains patent: Assess patency of urinary catheter     Problem: Infection - Adult  Goal: Absence of infection at discharge  10/7/2024 0758 by Sindy Saldivar RN  Flowsheets (Taken 10/7/2024 0758)  Absence of infection at discharge:   Assess and monitor for signs and symptoms of infection   Monitor lab/diagnostic results   Monitor all insertion sites i.e., indwelling lines, tubes and drains     Problem: ABCDS Injury Assessment  Goal: Absence of physical injury  Flowsheets (Taken 10/7/2024 0758)  Absence of Physical Injury: Implement safety measures based on patient assessment

## 2024-10-08 ENCOUNTER — CLINICAL DOCUMENTATION (OUTPATIENT)
Dept: PHYSICAL THERAPY | Age: 39
End: 2024-10-08

## 2024-10-08 DIAGNOSIS — G37.3 TRANSVERSE MYELITIS (HCC): Primary | ICD-10-CM

## 2024-10-08 PROBLEM — R93.7 ABNORMAL MRI, CERVICAL SPINE: Status: ACTIVE | Noted: 2024-10-08

## 2024-10-08 PROBLEM — E55.9 VITAMIN D DEFICIENCY: Status: ACTIVE | Noted: 2024-10-08

## 2024-10-08 PROBLEM — G36.0 NEUROMYELITIS OPTICA SPECTRUM DISORDER (HCC): Status: ACTIVE | Noted: 2024-10-08

## 2024-10-08 PROCEDURE — 97535 SELF CARE MNGMENT TRAINING: CPT

## 2024-10-08 PROCEDURE — 97110 THERAPEUTIC EXERCISES: CPT

## 2024-10-08 PROCEDURE — 97140 MANUAL THERAPY 1/> REGIONS: CPT

## 2024-10-08 PROCEDURE — 97530 THERAPEUTIC ACTIVITIES: CPT

## 2024-10-08 PROCEDURE — 97542 WHEELCHAIR MNGMENT TRAINING: CPT

## 2024-10-08 PROCEDURE — 6360000002 HC RX W HCPCS: Performed by: HOSPITALIST

## 2024-10-08 PROCEDURE — 6370000000 HC RX 637 (ALT 250 FOR IP): Performed by: HOSPITALIST

## 2024-10-08 PROCEDURE — 2580000003 HC RX 258: Performed by: HOSPITALIST

## 2024-10-08 PROCEDURE — 1200000000 HC SEMI PRIVATE

## 2024-10-08 PROCEDURE — 6370000000 HC RX 637 (ALT 250 FOR IP): Performed by: NEUROMUSCULOSKELETAL MEDICINE & OMM

## 2024-10-08 RX ADMIN — Medication 10 ML: at 21:26

## 2024-10-08 RX ADMIN — ACETAMINOPHEN 650 MG: 325 TABLET ORAL at 16:57

## 2024-10-08 RX ADMIN — PANTOPRAZOLE SODIUM 40 MG: 40 TABLET, DELAYED RELEASE ORAL at 05:09

## 2024-10-08 RX ADMIN — KETOROLAC TROMETHAMINE 15 MG: 15 INJECTION, SOLUTION INTRAMUSCULAR; INTRAVENOUS at 21:26

## 2024-10-08 RX ADMIN — Medication 10 ML: at 11:11

## 2024-10-08 ASSESSMENT — PAIN SCALES - GENERAL
PAINLEVEL_OUTOF10: 4
PAINLEVEL_OUTOF10: 4

## 2024-10-08 ASSESSMENT — PAIN - FUNCTIONAL ASSESSMENT: PAIN_FUNCTIONAL_ASSESSMENT: ACTIVITIES ARE NOT PREVENTED

## 2024-10-08 ASSESSMENT — PAIN DESCRIPTION - LOCATION: LOCATION: HEAD

## 2024-10-08 ASSESSMENT — PAIN DESCRIPTION - ORIENTATION: ORIENTATION: MID

## 2024-10-08 NOTE — PLAN OF CARE
Problem: Discharge Planning  Goal: Discharge to home or other facility with appropriate resources  Outcome: Progressing     Problem: Pain  Goal: Verbalizes/displays adequate comfort level or baseline comfort level  Outcome: Progressing     Problem: Skin/Tissue Integrity  Goal: Absence of new skin breakdown  Description: 1.  Monitor for areas of redness and/or skin breakdown  2.  Assess vascular access sites hourly  3.  Every 4-6 hours minimum:  Change oxygen saturation probe site  4.  Every 4-6 hours:  If on nasal continuous positive airway pressure, respiratory therapy assess nares and determine need for appliance change or resting period.  Outcome: Progressing     Problem: Safety - Adult  Goal: Free from fall injury  Outcome: Progressing     Problem: Neurosensory - Adult  Goal: Achieves stable or improved neurological status  Outcome: Progressing     Problem: Genitourinary - Adult  Goal: Urinary catheter remains patent  Outcome: Progressing     Problem: Infection - Adult  Goal: Absence of infection at discharge  Outcome: Progressing  Goal: Absence of infection during hospitalization  Outcome: Progressing     Problem: ABCDS Injury Assessment  Goal: Absence of physical injury  Outcome: Progressing

## 2024-10-09 PROCEDURE — 1200000000 HC SEMI PRIVATE

## 2024-10-09 PROCEDURE — 6360000002 HC RX W HCPCS: Performed by: HOSPITALIST

## 2024-10-09 PROCEDURE — 97530 THERAPEUTIC ACTIVITIES: CPT

## 2024-10-09 PROCEDURE — 97535 SELF CARE MNGMENT TRAINING: CPT

## 2024-10-09 PROCEDURE — 6370000000 HC RX 637 (ALT 250 FOR IP): Performed by: NEUROMUSCULOSKELETAL MEDICINE & OMM

## 2024-10-09 PROCEDURE — 2580000003 HC RX 258: Performed by: HOSPITALIST

## 2024-10-09 RX ADMIN — KETOROLAC TROMETHAMINE 15 MG: 15 INJECTION, SOLUTION INTRAMUSCULAR; INTRAVENOUS at 21:54

## 2024-10-09 RX ADMIN — Medication 10 ML: at 21:55

## 2024-10-09 RX ADMIN — PANTOPRAZOLE SODIUM 40 MG: 40 TABLET, DELAYED RELEASE ORAL at 06:49

## 2024-10-09 ASSESSMENT — PAIN DESCRIPTION - ORIENTATION: ORIENTATION: LOWER;RIGHT;LEFT

## 2024-10-09 ASSESSMENT — PAIN DESCRIPTION - LOCATION: LOCATION: BACK

## 2024-10-09 ASSESSMENT — PAIN SCALES - GENERAL: PAINLEVEL_OUTOF10: 5

## 2024-10-09 NOTE — PLAN OF CARE
Problem: Discharge Planning  Goal: Discharge to home or other facility with appropriate resources  Outcome: Progressing     Problem: Pain  Goal: Verbalizes/displays adequate comfort level or baseline comfort level  Outcome: Progressing     Problem: Skin/Tissue Integrity  Goal: Absence of new skin breakdown  Description: 1.  Monitor for areas of redness and/or skin breakdown  2.  Assess vascular access sites hourly  3.  Every 4-6 hours minimum:  Change oxygen saturation probe site  4.  Every 4-6 hours:  If on nasal continuous positive airway pressure, respiratory therapy assess nares and determine need for appliance change or resting period.  Outcome: Progressing     Problem: Safety - Adult  Goal: Free from fall injury  Outcome: Progressing     Problem: Neurosensory - Adult  Goal: Achieves stable or improved neurological status  Outcome: Progressing     Problem: Genitourinary - Adult  Goal: Urinary catheter remains patent  Outcome: Progressing     Problem: Infection - Adult  Goal: Absence of infection at discharge  Outcome: Progressing  Goal: Absence of infection during hospitalization  Outcome: Progressing     Problem: ABCDS Injury Assessment  Goal: Absence of physical injury  Outcome: Progressing      Male

## 2024-10-10 VITALS
TEMPERATURE: 97.8 F | WEIGHT: 125 LBS | RESPIRATION RATE: 16 BRPM | HEART RATE: 94 BPM | OXYGEN SATURATION: 96 % | BODY MASS INDEX: 24.54 KG/M2 | DIASTOLIC BLOOD PRESSURE: 64 MMHG | SYSTOLIC BLOOD PRESSURE: 99 MMHG | HEIGHT: 60 IN

## 2024-10-10 LAB
BACTERIA CSF CULT: NORMAL
GRAM STN SPEC: NORMAL
MISCELLANEOUS LAB TEST RESULT: NORMAL

## 2024-10-10 PROCEDURE — 6370000000 HC RX 637 (ALT 250 FOR IP): Performed by: NEUROMUSCULOSKELETAL MEDICINE & OMM

## 2024-10-10 PROCEDURE — 97530 THERAPEUTIC ACTIVITIES: CPT

## 2024-10-10 PROCEDURE — 97542 WHEELCHAIR MNGMENT TRAINING: CPT

## 2024-10-10 PROCEDURE — 97112 NEUROMUSCULAR REEDUCATION: CPT

## 2024-10-10 PROCEDURE — 97535 SELF CARE MNGMENT TRAINING: CPT

## 2024-10-10 PROCEDURE — 94760 N-INVAS EAR/PLS OXIMETRY 1: CPT

## 2024-10-10 PROCEDURE — 2580000003 HC RX 258: Performed by: HOSPITALIST

## 2024-10-10 PROCEDURE — 6370000000 HC RX 637 (ALT 250 FOR IP): Performed by: HOSPITALIST

## 2024-10-10 RX ORDER — PANTOPRAZOLE SODIUM 40 MG/1
40 TABLET, DELAYED RELEASE ORAL
Qty: 30 TABLET | Refills: 3 | Status: SHIPPED | OUTPATIENT
Start: 2024-10-11

## 2024-10-10 RX ORDER — TAMSULOSIN HYDROCHLORIDE 0.4 MG/1
0.4 CAPSULE ORAL DAILY
Qty: 30 CAPSULE | Refills: 0 | Status: SHIPPED | OUTPATIENT
Start: 2024-10-10

## 2024-10-10 RX ORDER — ERGOCALCIFEROL 1.25 MG/1
50000 CAPSULE ORAL WEEKLY
Qty: 5 CAPSULE | Refills: 1 | Status: SHIPPED | OUTPATIENT
Start: 2024-10-11

## 2024-10-10 RX ADMIN — ACETAMINOPHEN 650 MG: 325 TABLET ORAL at 09:53

## 2024-10-10 RX ADMIN — PANTOPRAZOLE SODIUM 40 MG: 40 TABLET, DELAYED RELEASE ORAL at 05:05

## 2024-10-10 RX ADMIN — Medication 10 ML: at 08:33

## 2024-10-10 ASSESSMENT — PAIN SCALES - GENERAL
PAINLEVEL_OUTOF10: 4
PAINLEVEL_OUTOF10: 2

## 2024-10-10 ASSESSMENT — PAIN SCALES - WONG BAKER: WONGBAKER_NUMERICALRESPONSE: HURTS A LITTLE BIT

## 2024-10-10 ASSESSMENT — PAIN DESCRIPTION - LOCATION: LOCATION: HEAD

## 2024-10-10 ASSESSMENT — PAIN DESCRIPTION - DESCRIPTORS: DESCRIPTORS: ACHING

## 2024-10-10 ASSESSMENT — PAIN DESCRIPTION - ORIENTATION: ORIENTATION: MID

## 2024-10-10 NOTE — CONSULTS
Session ID: [id number]  Language: [facility]   ID: #[id number]   Name: Herve
Session ID: [id number]  Language: Italian   ID: #[id number]   Name: Emily
Session ID: [id number]  Language: Uzbek   ID: #[id number]   Name: Silvestre
Session ID: [id number]  Language: Slovenian   ID: #[id number]   Name: Betsy
Session ID: [id number]  Language: [facility]   ID: #[id number]   Name: Azam
Session ID: [id number]  Language: [facility]   ID: #[id number]   Name: Sandra
Session ID: [id number]  Language: French   ID: #[id number]   Name: Ravi
Session ID: [id number]  Language: [facility]   ID: #[id number]   Name: Herve
Session ID: [id number]  Language: [facility]   ID: #[id number]   Name: Sandra
Session ID: [id number]  Language: Bengali   ID: #[id number]   Name: Sukhjinder
Session ID: [id number]  Language: Serbian   ID: #[id number]   Name: Efren: Perfecto   ID:   Language: Perfecto   ID:   
Session ID: [id number]  Language: Czech   ID: #[id number]   Name: Cristiano
Session ID: [id number]  Language: Tamazight   ID: #[id number]   Name: Eduardo
Session ID: [id number]  Language: Persian   ID: #[id number]   Name: Jan       
Session ID: [id number]  Language: [facility]   ID: #[id number]   Name: Herve
Session ID: [id number]  Language: Wolof   ID: #[id number]   Name: Jan
Session ID: [id number]  Language: [facility]   ID: #[id number]   Name: Sandra
Session ID: [id number]  Language: Turkish   ID: #[id number]   Name: Bogdan
Session ID: [id number]  Language: Swedish   ID: #[id number]   Name: Ravi
Session ID: [id number]  Language: Kazakh   ID: #[id number]   Name: Anai
Session ID: [id number]  Language: Malay   ID: #[id number]   Name: Eduardo
Session ID: [id number]  Language: [facility]   ID: #[id number]   Name: Herve
Session ID: [id number]  Language: [facility]   ID: [id number]  
Session ID: [id number]  Language: Estonian   ID: #[id number]   Name: Bashir
pending per administration. If unable to be accepted to any other local IPR, would recommend additional rehabilitation at skilled nursing facility prior to returning home.    Bradly Elizabeth MD 10/7/2024, 7:48 PM    * This document was created using dictation software.  While all precautions were taken to ensure accuracy, errors may have occurred.  Please disregard any typographical errors.     
days  Monitor renal function  Hydration  Landeros catheter for urinary retention  PT and OT  GI and DVT prophylaxis.  Hold DVT prophylaxis prior to LP  Will follow   Discussed with primary team, family      Thank you for referring such patient. If you have any questions regarding my consult note, please don't hesitate to call me.     IVETT Garcia, APRN - CNP        This dictation was generated by voice recognition computer software. Although all attempts are made to edit the dictation for accuracy, there may be errors in the  transcription that are not intended

## 2024-10-10 NOTE — CARE COORDINATION
Discharge Planning:     (CM) added the following to the patient DUNCAN:    [facility]  [address]   [address], [place] [address]  Phone: [phone]  Patient to call: No  Appointment scheduled prior to d/c: Yes: Comment:   Date of appt:  11/01/2024 at 10:20 am. With Dr. Zavala    Patient is discharging home today via medical transport at 6:00 pm today.    Electronically signed by ELI Rai on 10/10/2024 at 4:12 PM

## 2024-10-10 NOTE — CARE COORDINATION
Discharge Planning:     (CM) called the following facilities asking if any one would accept patient on genevieve:    [facility]    All facilities have declined patient.    CM will speak to attending regarding different discharge disposition.    Electronically signed by ELI Rai on 10/10/2024 at 9:03 AM

## 2024-10-10 NOTE — DISCHARGE SUMMARY
Hospital Medicine Discharge Summary    Patient ID: Shelly Childs      Patient's PCP: No primary care provider on file.    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    Hospital Course:   Patient is a 39-year-old lady with no significant past medical history presenting with bilateral lower extremity weakness with MRI cervical thoracic spine noted for large segment abnormality of the spinal cord signal between C7 and T6 suggestive of neuromyelitis optica spectrum disorder. MRI of the brain and MRI of the lumbar spine did not show any acute abnormality. Patient was started on high-dose steroids and CSF studies sent out     Neuromyelitis optica spectrum disorder with Bilateral leg weakness and Acute urinary retention: Noted with abnormal MRI of the cervical thoracic spine with large segment abnormality of the spinal cord.  Patient received a 5-day high-dose steroid burst.  Slowly improving though remains weak.  For insurance purposes, patient not eligible for inpatient rehab.  Discussed with  this morning.  Will discharge home with home care service.  Discussed with neurology, patient has a set up appointment with neurology at  according to neurology        Active Problems:     Vitamin D deficiency continue vitamin D supplement    Abnormal MRI, cervical spine            Physical Exam Performed:     BP 99/64   Pulse 97

## 2024-10-10 NOTE — PLAN OF CARE
Problem: Discharge Planning  Goal: Discharge to home or other facility with appropriate resources  10/10/2024 0832 by Sindy Saldivar RN  Flowsheets (Taken 10/10/2024 0832)  Discharge to home or other facility with appropriate resources:   Identify barriers to discharge with patient and caregiver   Arrange for needed discharge resources and transportation as appropriate   Identify discharge learning needs (meds, wound care, etc)     Problem: Pain  Goal: Verbalizes/displays adequate comfort level or baseline comfort level  10/10/2024 0832 by Sindy Saldivar RN  Flowsheets (Taken 10/10/2024 0832)  Verbalizes/displays adequate comfort level or baseline comfort level:   Encourage patient to monitor pain and request assistance   Assess pain using appropriate pain scale   Administer analgesics based on type and severity of pain and evaluate response     Problem: Safety - Adult  Goal: Free from fall injury  10/10/2024 0832 by Sindy Saldivar RN  Flowsheets (Taken 10/10/2024 0832)  Free From Fall Injury: Instruct family/caregiver on patient safety     Problem: ABCDS Injury Assessment  Goal: Absence of physical injury  Flowsheets (Taken 10/10/2024 0832)  Absence of Physical Injury: Implement safety measures based on patient assessment

## 2024-10-10 NOTE — DISCHARGE INSTR - COC
Continuity of Care Form    Patient Name: Shelly Childs   :  1985  MRN:  [id number]    Admit date:  10/3/2024  Discharge date:  10/10/2024    Code Status Order: Full Code   Advance Directives:   Advance Care Flowsheet Documentation             Admitting Physician:  Jenifer Mcdaniel MD  PCP: No primary care provider on file.    Discharging Nurse: Sindy Saldivar  Discharging Hospital Unit/Room#: [id number]/4465-01  Discharging Unit Phone Number: [phone]    Emergency Contact:   Extended Emergency Contact Information  Primary Emergency Contact: Akin champion  Home Phone: [phone]  Relation: Spouse  Secondary Emergency Contact: Deysi Hernández  Home Phone: [phone]  Relation: Niece/Nephew    Past Surgical History:  History reviewed. No pertinent surgical history.    Immunization History:     There is no immunization history on file for this patient.    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       Isolation/Infection:   Isolation            No Isolation          Patient Infection Status       None to display            Nurse Assessment:  Last Vital Signs: BP 92/60   Pulse 76   Temp 97.6 °F (36.4 °C) (Oral)   Resp 16   Ht 1.524 m (5')   Wt 56.7 kg (125 lb)   SpO2 95%   BMI 24.41 kg/m²     Last documented pain score (0-10 scale): Pain Level: 2  Last Weight:   Wt Readings from Last 1 Encounters:   10/03/24 56.7 kg (125 lb)     Mental Status:  oriented and alert    IV Access:  - None    Nursing Mobility/ADLs:  Walking   Dependent  Transfer  Dependent  Bathing  Dependent  Dressing  Dependent  Toileting  Dependent  Feeding  Assisted  Med Admin  Assisted  Med Delivery   whole    Wound Care Documentation and Therapy:        Elimination:  Continence:

## 2024-10-11 LAB
OLIGOCLONAL BANDS CSF QL: 0
OLIGOCLONAL BANDS SERPL QL: 0
PROT PATTERN CSF ELPH-IMP: NORMAL

## 2024-10-11 NOTE — PROGRESS NOTES
Hospitalist Progress Note      PCP: No primary care provider on file.    Date of Admission: 10/3/2024    LOS: 5    Chief Complaint:   Chief Complaint   Patient presents with    Numbness     Pt presents to ed w/ cc of numbness from the waist down. Pt states that a few weeks ago she had an infection in her left foot that has continued to worsen alongside losing feeling below the waist. Pt still has control over bowel movements, needing only assistance to get onto the toilet. Pt also endorses right sided chest pain for two weeks.        Case Summary:   39-year-old lady with no significant past medical history presenting with bilateral lower extremity weakness with MRI cervical thoracic spine noted for large segment abnormality of the spinal cord signal between C7 and T6 suggestive of neuromyelitis optica spectrum disorder.  MRI of the brain and MRI of the lumbar spine did not show any acute abnormality.  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  Patient was initiated on high-dose steroids.  -She completed 5 days of high-dose steroids yesterday 10/7/2024  -Continue PT OT  - Follow-up CSF and serum studies  - Follow-up MOG antibody and neuromyelitis optica aquaporin as per neurology  - Outpatient follow-up with neurology at discharge      Active Problems:     Vitamin D deficiency    Abnormal MRI, cervical 
    Low Risk Nutrition Note     Reason for Visit:   Length of Stay    Nutrition Assessment:    Nutrition intervention triggered for LOS. Pt receives a regular diet with po intake greater than 75% of meals per EMR. No wt hx or indications of wt changes to review. Pt is at low risk for nutrition compromise.     Current Nutrition Therapies:    ADULT DIET; Regular    Anthropometrics:   Current Height: 152.4 cm (5')  Current Weight - Scale: 56.7 kg (125 lb)      Monitoring and Evaluation:  No nutrition diagnosis. Patient will be monitored per nutrition standards of care.     Consult Dietitian if nutrition intervention essential to patient care is needed.     Discharge Planning:  No needs    Connie Bower RD, LD      
    Shelly Hernández Amadeo  Neurology Follow-up  [facility]    Date of Service: 10/7/2024    Subjective:   CC: Follow up today regarding: Acute lower extremity weakness    Events noted. Chart and lab reviewed.  Patient seen this afternoon,  is bedside.  Patient reports improvement in her lower extremity weakness.  She has minimal back pain.  CSF studies are still pending.  She offers no new complaints today      ROS : A 10-12 system review obtained and updated today and is unremarkable except as mentioned  in my interval history.     Past medical history, social history, medication and family history reviewed.       Objective:  Exam:   Constitutional:   Vitals:    10/07/24 0004 10/07/24 0435 10/07/24 0756 10/07/24 1513   BP: 109/64 103/66 111/72 117/71   Pulse: 74 67 66 70   Resp: 16 16 16 16   Temp: 97.4 °F (36.3 °C) 97.9 °F (36.6 °C) 97.6 °F (36.4 °C) 97.5 °F (36.4 °C)   TempSrc: Oral Oral Oral Oral   SpO2: 94% 95% 94% 96%   Weight:       Height:         General appearance:  Normal development and appear in no acute distress.   Mental Status:   Oriented to person, place, problem, and time.    Memory: Good immediate recall.  Intact remote memory  Normal attention span and concentration.  Language: intact naming, repeating and fluency   Good fund of Knowledge.   Cranial Nerves:   II:   Pupils: equal, round, reactive to light  III,IV,VI: Extra Ocular Movements are intact. No nystagmus  V: Facial sensation is intact  VII: Facial strength and movements: intact and symmetric  XII: Tongue movements are normal  Musculoskeletal:  3+/5 bilateral lower extremity weakness, 5/5 bilateral upper extremity.  Tone: Normal tone.   Reflexes: Symmetric 2+ in both arms and legs.  Coordination: no pronator drift, no dysmetria with FNF  Sensation: Decree sensation to touch and vibration, and lower legs more left than right.  Gait/Posture: Nonambulatory    MDM:      A. Problems (any 1)    High:    [x] 
    Shelly Hernández Amadeo  Neurology Follow-up  [facility]    Date of Service: 10/4/2024    Subjective:   CC: Follow up today regarding: Acute lower extremity weakness    Events noted. Chart and lab reviewed.  Patient seen this morning,  is at bedside.  With the help of  was able to conduct interview and exam.    We discussed MRI C and T-spine with contrast findings this morning which did not show any abnormal enhancements.  CSF study showed elevated protein, other CSF studies are pending.   She has Landeros catheter.  She is to receive second dose of IV steroids today. She reports same lower leg weakness.  She reports mild pain in her mid back.  Other review of systems unremarkable      ROS : A 10-12 system review obtained and updated today and is unremarkable except as mentioned  in my interval history.     Past medical history, social history, medication and family history reviewed.       Objective:  Exam:   Constitutional:   Vitals:    10/04/24 0447 10/04/24 0448 10/04/24 0715 10/04/24 1227   BP:  105/70 (!) 102/58 100/60   Pulse:  86 72 82   Resp:  16  18   Temp:  98.1 °F (36.7 °C) 98.3 °F (36.8 °C) 99.1 °F (37.3 °C)   TempSrc: Oral Oral Oral Oral   SpO2:  96% 95% 97%   Weight:       Height:         General appearance:  Normal development and appear in no acute distress.   Mental Status:   Oriented to person, place, problem, and time.    Memory: Good immediate recall.  Intact remote memory  Normal attention span and concentration.  Language: intact naming, repeating and fluency   Good fund of Knowledge.   Cranial Nerves:   II:   Pupils: equal, round, reactive to light  III,IV,VI: Extra Ocular Movements are intact. No nystagmus  V: Facial sensation is intact  VII: Facial strength and movements: intact and symmetric  XII: Tongue movements are normal  Musculoskeletal: Same 3/5 bilateral lower extremity weakness, 5/5 bilateral upper extremity.  Tone: Normal tone. 
  [facility] - Inpatient Rehabilitation Department   Phone: [phone]    Occupational Therapy    [] Initial Evaluation            [x] Daily Treatment Note         [] Discharge Summary      Patient: Shelly Childs   : 1985   MRN: [id number]   Date of Service:  10/8/2024    Admitting Diagnosis:  Neuromyelitis optica spectrum disorder (HCC)  Current Admission Summary: Shelly Childs is a 39 y.o. female who presented with presents to ER with complaints of lower extremity weakness.  Most of the history obtained from family and .     Patient patient started having lower leg leg weakness more than about 3 weeks ago that progressively got worse.  Patient was weak to the point of having having to carry her to and from bathroom.  Having some difficulty with urinary retention.  No bowel or bladder con incontinence.  Denies any fall.  No history of fevers chills no recent travel.  Patient not antibiotics no previous history of back pain.  Otherwise healthy.    MRI C and T-spine concerning for transverse myelitis V. Neuromyelitis optica     Past Medical History:  has no past medical history on file.  Past Surgical History:  has no past surgical history on file.    Discharge Recommendations: Shelly Childs scored a 13/24 on the AM-PAC ADL Inpatient form. Current research shows that an AM-PAC score of 17 or less is typically not associated with a discharge to the patient's home setting. Based on the patient's AM-PAC score and their current ADL deficits, it is recommended that the patient have 5-7 sessions per week of Occupational Therapy at d/c to increase the patient's independence.  At this time, this patient demonstrates complex nursing, medical, and rehabilitative needs, and would benefit from intensive rehabilitation services upon discharge from the Inpatient setting.  This patient demonstrates the ability to participate in and benefit from an intensive therapy 
  [facility] - Inpatient Rehabilitation Department   Phone: [phone]    Occupational Therapy    [] Initial Evaluation            [x] Daily Treatment Note         [] Discharge Summary      Patient: Shelly Childs   : 1985   MRN: [id number]   Date of Service:  10/9/2024    Admitting Diagnosis:  Neuromyelitis optica spectrum disorder (HCC)  Current Admission Summary: Shelly Childs is a 39 y.o. female who presented with presents to ER with complaints of lower extremity weakness.  Most of the history obtained from family and .     Patient patient started having lower leg leg weakness more than about 3 weeks ago that progressively got worse.  Patient was weak to the point of having having to carry her to and from bathroom.  Having some difficulty with urinary retention.  No bowel or bladder con incontinence.  Denies any fall.  No history of fevers chills no recent travel.  Patient not antibiotics no previous history of back pain.  Otherwise healthy.    MRI C and T-spine concerning for transverse myelitis V. Neuromyelitis optica     Past Medical History:  has no past medical history on file.  Past Surgical History:  has no past surgical history on file.    Discharge Recommendations: Shelly Childs scored a 13/24 on the AM-PAC ADL Inpatient form. Current research shows that an AM-PAC score of 17 or less is typically not associated with a discharge to the patient's home setting. Based on the patient's AM-PAC score and their current ADL deficits, it is recommended that the patient have 5-7 sessions per week of Occupational Therapy at d/c to increase the patient's independence.  At this time, this patient demonstrates complex nursing, medical, and rehabilitative needs, and would benefit from intensive rehabilitation services upon discharge from the Inpatient setting.  This patient demonstrates the ability to participate in and benefit from an intensive therapy 
  [facility] - Inpatient Rehabilitation Department   Phone: [phone]    Occupational Therapy    [] Initial Evaluation            [x] Daily Treatment Note         [] Discharge Summary      Patient: Shelly Childs   : 1985   MRN: [id number]   Date of Service:  10/7/2024    Admitting Diagnosis:  Transverse myelitis (HCC)  Current Admission Summary: Shelly Childs is a 39 y.o. female who presented with presents to ER with complaints of lower extremity weakness.  Most of the history obtained from family and .     Patient patient started having lower leg leg weakness more than about 3 weeks ago that progressively got worse.  Patient was weak to the point of having having to carry her to and from bathroom.  Having some difficulty with urinary retention.  No bowel or bladder con incontinence.  Denies any fall.  No history of fevers chills no recent travel.  Patient not antibiotics no previous history of back pain.  Otherwise healthy.    MRI C and T-spine concerning for transverse myelitis V. Neuromyelitis optica     Past Medical History:  has no past medical history on file.  Past Surgical History:  has no past surgical history on file.    Discharge Recommendations: Shelly Childs scored a 13/24 on the AM-PAC ADL Inpatient form. Current research shows that an AM-PAC score of 17 or less is typically not associated with a discharge to the patient's home setting. Based on the patient's AM-PAC score and their current ADL deficits, it is recommended that the patient have 5-7 sessions per week of Occupational Therapy at d/c to increase the patient's independence.  At this time, this patient demonstrates complex nursing, medical, and rehabilitative needs, and would benefit from intensive rehabilitation services upon discharge from the Inpatient setting.  This patient demonstrates the ability to participate in and benefit from an intensive therapy program with a 
  [facility] - Inpatient Rehabilitation Department   Phone: [phone]    Occupational Therapy    [] Initial Evaluation            [x] Daily Treatment Note         [] Discharge Summary      Patient: Shelly Childs   : 1985   MRN: [id number]   Date of Service:  10/10/2024    Admitting Diagnosis:  Neuromyelitis optica spectrum disorder (HCC)  Current Admission Summary: Shelly Childs is a 39 y.o. female who presented with presents to ER with complaints of lower extremity weakness.  Most of the history obtained from family and .     Patient patient started having lower leg leg weakness more than about 3 weeks ago that progressively got worse.  Patient was weak to the point of having having to carry her to and from bathroom.  Having some difficulty with urinary retention.  No bowel or bladder con incontinence.  Denies any fall.  No history of fevers chills no recent travel.  Patient not antibiotics no previous history of back pain.  Otherwise healthy.    MRI C and T-spine concerning for transverse myelitis V. Neuromyelitis optica     Past Medical History:  has no past medical history on file.  Past Surgical History:  has no past surgical history on file.    Discharge Recommendations: Shelly Childs scored a 13/24 on the AM-PAC ADL Inpatient form. Current research shows that an AM-PAC score of 17 or less is typically not associated with a discharge to the patient's home setting. Based on the patient's AM-PAC score and their current ADL deficits, it is recommended that the patient have 5-7 sessions per week of Occupational Therapy at d/c to increase the patient's independence.  At this time, this patient demonstrates complex nursing, medical, and rehabilitative needs, and would benefit from intensive rehabilitation services upon discharge from the Inpatient setting.  This patient demonstrates the ability to participate in and benefit from an intensive therapy 
  [facility] - Inpatient Rehabilitation Department   Phone: [phone]    Occupational Therapy    [x] Initial Evaluation            [] Daily Treatment Note         [] Discharge Summary      Patient: Shelly Childs   : 1985   MRN: [id number]   Date of Service:  10/4/2024    Admitting Diagnosis:  Transverse myelitis (HCC)  Current Admission Summary: Shelly Childs is a 39 y.o. female who presented with presents to ER with complaints of lower extremity weakness.  Most of the history obtained from family and .     Patient patient started having lower leg leg weakness more than about 3 weeks ago that progressively got worse.  Patient was weak to the point of having having to carry her to and from bathroom.  Having some difficulty with urinary retention.  No bowel or bladder con incontinence.  Denies any fall.  No history of fevers chills no recent travel.  Patient not antibiotics no previous history of back pain.  Otherwise healthy.    MRI C and T-spine concerning for transverse myelitis V. Neuromyelitis optica     Past Medical History:  has no past medical history on file.  Past Surgical History:  has no past surgical history on file.    Discharge Recommendations: Shelly Childs scored a 13/24 on the AM-PAC ADL Inpatient form. Current research shows that an AM-PAC score of 17 or less is typically not associated with a discharge to the patient's home setting. Based on the patient's AM-PAC score and their current ADL deficits, it is recommended that the patient have 5-7 sessions per week of Occupational Therapy at d/c to increase the patient's independence.  At this time, this patient demonstrates complex nursing, medical, and rehabilitative needs, and would benefit from intensive rehabilitation services upon discharge from the Inpatient setting.  This patient demonstrates the ability to participate in and benefit from an intensive therapy program with a 
  [facility] - Inpatient Rehabilitation Department   Phone: [phone]    Physical Therapy    [] Initial Evaluation            [x] Daily Treatment Note         [] Discharge Summary      Patient: Shelly Childs   : 1985   MRN: [id number]   Date of Service:  10/7/2024  Admitting Diagnosis: Transverse myelitis (HCC)  Current Admission Summary: For apparently 3 weeks the patient has been having difficulty in symptoms over lower extremities with the numbness and weakness of the legs difficulty with urination and emptying her bladder and mid back to upper back pain  She went to 2 different medical centers for evaluation prior to  presentation here without full workup    states that he has been carrying his wife with assistance from her sister back and forth to the bathroom as he is she has been completely immobile over the past 3 weeks    MRI C and T-spine showed large segment (C7-T6) abnormal spinal cord signal, which is highly suggestive of neuromyelitis optica spectrum disorder (NMOSD).  MRI brain and MRI L-spine did not show any acute abnormality.     Past Medical History:  has no past medical history on file.  Past Surgical History:  has no past surgical history on file.  Discharge Recommendations: Shelly Childs scored a 8/24 on the AM-PAC short mobility form. Current research shows that an AM-PAC score of 17 or less is typically not associated with a discharge to the patient's home setting. Based on the patient's AM-PAC score and their current functional mobility deficits, it is recommended that the patient have 5-7 sessions per week of Physical Therapy at d/c to increase the patient's independence.  At this time, this patient demonstrates complex nursing, medical, and rehabilitative needs, and would benefit from intensive rehabilitation services upon discharge from the Inpatient setting.  This patient demonstrates the ability to participate in and benefit from an 
  [facility] - Inpatient Rehabilitation Department   Phone: [phone]    Physical Therapy    [] Initial Evaluation            [] Daily Treatment Note         [] Discharge Summary      Patient: Shelly Childs   : 1985   MRN: [id number]   Date of Service:  10/4/2024  Admitting Diagnosis: Transverse myelitis (HCC)  Current Admission Summary: For apparently 3 weeks the patient has been having difficulty in symptoms over lower extremities with the numbness and weakness of the legs difficulty with urination and emptying her bladder and mid back to upper back pain  She went to 2 different medical centers for evaluation prior to  presentation here without full workup    states that he has been carrying his wife with assistance from her sister back and forth to the bathroom as he is she has been completely immobile over the past 3 weeks    MRI C and T-spine showed large segment (C7-T6) abnormal spinal cord signal, which is highly suggestive of neuromyelitis optica spectrum disorder (NMOSD).  MRI brain and MRI L-spine did not show any acute abnormality.     Past Medical History:  has no past medical history on file.  Past Surgical History:  has no past surgical history on file.  Discharge Recommendations: Shelly Childs scored a 9/24 on the AM-PAC short mobility form. Current research shows that an AM-PAC score of 17 or less is typically not associated with a discharge to the patient's home setting. Based on the patient's AM-PAC score and their current functional mobility deficits, it is recommended that the patient have 5-7 sessions per week of Physical Therapy at d/c to increase the patient's independence.  At this time, this patient demonstrates complex nursing, medical, and rehabilitative needs, and would benefit from intensive rehabilitation services upon discharge from the Inpatient setting.  This patient demonstrates the ability to participate in and benefit from an 
3:25 PM  Patient is missing bilateral great toe nails. Patient reports (through reese translation) that she \"had an infection\" and she \"went to a specialist who prescribed her cream for them\" but they \"weren't healing so two weeks ago they removed bilateral toenails\". Patient reports numbness began after toenail removal. RN notified provider and painted toenails with iodine SOLE.  
CLINICAL PHARMACY NOTE: MEDS TO BEDS    Total # of Prescriptions Filled:3   The following medications were delivered to the patient:  VITAMIN D2  PANTOPRAZOLE SODIUM 40MG  TAMSULOSIN HCL 0.4MG    Additional Documentation:  Delivered to patients room = signed  Ok to be delivered per [facility]    
Discharged paper reviewed. All questions answered. IV taken out. No complications. All belongings packed. Waiting on transportation.     Electronically signed by Sindy Saldivar RN on 10/10/2024 at 6:54 PM    
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      Patient seated in a chair at bedside  with her in the room reports some improvement of her back pain no new complaints  Seem to have some improvement of her lower extremity movement wiggling her toes and ankles and raising legs off the bed but unable to hold it up or exhibit any resistance.  Site of lumbar puncture appears to be healed without infection or drainage.  No complaints of her arms or neck or any cranial nerves abnormality and no visual complaints or prior history of optic neuritis or visual impairment or complaints.    Workup:      Cervical and thoracic spine MRI scan with contrast was read as showing no solid or  convincing abnormal enhancement of the spinal and cord of the cervical and thoracic spine.     IMPRESSION:  No convincing abnormal cord enhancement of the cervical and thoracic spine.  It continues to show however the previously described spinal cord lesion Hyperintense T2 signal within the thoracic cord extending from T1 through T6.  Confirming the diagnosis of transverse myelitis likely secondary to demyelinating  Disease    Brain MRI scan was negative  CSF showed 7 WBC, normal glucose, 9 RBCs, elevated protein of 86 and elevated CSF IgG G.  Oligoclonal bands and aquaporin antibodies still pending.  CSF IgG index was normal at 0.55  CSF and urine cultures were negative and synthesis rate normal 2.7  Normal chest x-ray and EKG  Unremarkable serology has been negative for Lyme, HIV, B12 level of 2000, TSH, serum pregnancy, toxicology liver and renal functions.  ESR 26, normal PTT and 
Patient has left the unit via transport, all questions answered, alert and oriented, paper work and patient's belongings taken.  
Patient is alert and oriented x4, VSS. Denies pain. Decreased sensation to bilateral legs continued. Leg weakness continued. Landeros with adequate output. Bedrest continued. Tolerating diet. Family at bedside. Fall precautions in place.   
Pharmacy Home Medication Reconciliation Note    A medication reconciliation has been completed for Shelly Childs 1985    Pharmacy: [facility] [id number], [place], [place]  Information provided by: patient via     The patient's home medication list is as follows:  No current facility-administered medications on file prior to encounter.     No current outpatient medications on file prior to encounter.         Timing of last doses updated.    Thank you,  Amaya Barth CPhT      
Pt to 4t from ED with spouse Akin Hernández who speak Afghan and Perfecto the patient's only language. As english is not the spouse's primary language and a [facility]  is not available the language line  christoph was used during admission. Akin states the will likely need to speak with a financial counselor during their stay and they may need assistance to pay for medication. Will order financial consult. Able to obtain admission info successfully with LL. Patient states she takes no medications at home and has no allergies. States headache but no other pain. Can move toes on command but reports sensation in left foot but numbness in right. States she has been unable to ambulate for 3 weeks, family has been carrying her to bathroom. States she has steady housing with her spouse in  an apartment and help at home from her brother as well. Discussed plan of care and medications ordered for her and discussed that LL is available at all times and is free of charge to them. Both verbalized understanding. Oriented to call light and room and discussed bed alarm and bed rest status. Pt is axox4 and able to answer questions and follow commands throughout assessment. Call light in reach.    
Session ID: [id number]  Language: Syriac   ID: [id number]   name: Edmund  
Shift assessment completed and charted. VSS. A/o x4. Standard safety measures in place. Bed locked and in lowest position, call light within reach. SpO2 > 90% on RA. PRN tylenol given for thoracic back pain that radiates to BLE. Patient denies numbness or tingling in bilateral legs or feet. Patient denies nausea or vomiting. Pt stable and denied needs when writer left room.    
Shift assessment completed, pt is alert and oriented, VSS, fall precautions in place, call precautions in place, call light within reach, pt speaks Perfecto, unable to use interpretor due to technical issues, significant other speaks some English. Toradol for pain, denies any other needs at this time, see flowsheets and MAR, will monitor pt. The care plan and education has been reviewed and mutually agreed upon with the patient.      
Shift assessment completed. Routine vitals stable. Scheduled medications given. Patient is awake, alert and oriented. Respirations are easy and unlabored. Patient does not appear to be in distress, resting comfortably at this time. Call light within reach.    
abduction  (L) Knee: Can achieve ~50-75% of full ROM     (R) Knee: Can achieve ~50-75% of full ROM  (L) Ankle: WFL     (R) Ankle: WFL    PROM WFL BLE  Strength:    (L) Hip ABductors 1/5  (R) Hip ABductors 1/5    (L) Hip ADDuctors 3+/5  (R) Hip ADDductors 3+/5   (L) Knee: +2     (R) Knee: +2  (L) Ankle: 4     (R) Ankle: 4  Therapist Clinical Decision Making (Complexity): medium complexity  Clinical Presentation: evolving      Subjective  General: Supine in bed upon arrival and agreeable to working with PT.  uses, increased time needed for communication.   Pain: 0/10--denies at rest.  Pain Interventions: repositioned  and therapy activities modified       Functional Mobility  Bed Mobility:  Supine to Sit: 2 person assistance with mod A x 2   Rolling Left: moderate assistance  Scooting: moderate assistance  Bridging: minimal assistance - to hold LEs  Comments: bed flat and use of bed rail.   Transfers:  Bed to chair transfer: 2 person assistance with max A   Lateral seated scoot transfer: 2 person assistance with max A   Comments: Pt attempting to use UEs to assist with transfer but not much help. Pt moved from bed to WC, then WC to drop arm recliner using slide board. Max A x 2 for trunk control   Ambulation:  Ambulation not tested on this date secondary to BLE weakness and safety concerns.  Distance: N/A  Gait Mechanics: N/A  Comments:    Stair Mobility:  Stair mobility not completed on this date.  Comments:  Wheelchair Mobility:  Chair: manual  Surface: level surface  Method: (R) UE and (L) UE  Distance: 150 ft  Assistance: stand by assistance  Comments: Pt taking 1rest break, then able to continue  Balance:  Static Sitting Balance: poor (+): requires min (A) to maintain balance  Dynamic Sitting Balance: poor (+): requires min (A) to maintain balance  Comments: mod A sitting balance progressing to CGA sitting balance. Pt better able to balance seated EOB today.      Other Therapeutic Interventions  Pt 
numbness and tingling   Proprioception:    diminished proprioception in (B) LE  Tone:   Normotonic  Coordination Testing:   WFL    ROM:   (B) UE AROM WFL  Strength:   (B) UE strength grossly WFL    Therapist Clinical Decision Making (Complexity): medium complexity  Clinical Presentation: stable      Subjective: Pt supine in bed upon entry, pleasant and agreeable to therapy session. Pt's  present.   General: Attempted multiple times language audio  services SIRISHA (Mayan dialect) and no interpreters available.   Pain: Patient does not rate upon questioning pt with some pain in back during transitional movements  Pain Interventions: patient denies pain interventions        Activities of Daily Living  Basic Activities of Daily Living  Grooming: setup assistance supervision  Grooming Comments: seated in wc at sink with oral care/wash face/comb hair/wash hands  Upper Extremity Dressing: Min A to pull shirt down in back, seated EOB while donning personal shirt pt with great LOB posteriorly needing Max A to correct  Instrumental Activities of Daily Living  No IADL completed on this date.    Functional Mobility  Bed Mobility:  Supine to Sit: maximum assistance  Scooting: maximum assistance  Comments: bed flat and use of bed rail  Transfers:  Sit to stand transfer:stedy utilized requiring Max Ax  2   Stand to sit transfer: stedy utilized requiring Max Ax  2   Squat pivot transfer: 2 person assistance with Max A  2   Slide board transfer: 2 person assistance with Max A x 2   Comments: Pt with BLEs buckling on Nataly Everett, not able to fully stand and needed Max A x 2 to safely get back into wc. Pt during slide board transfer with posterior lean.  Functional Mobility  No functional mobility completed on this date secondary to unable.  Balance:  Static Sitting Balance: fair (-): maintains balance at CGA with use of UE support  Dynamic Sitting Balance: poor (-): requires max (A) to maintain 
region.   2. 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   3. 2 mm broad-based central disc protrusion at C2-C3 with moderate central   spinal canal narrowing.         MRI BRAIN WO CONTRAST   Final Result   1. Unremarkable MRI of the brain.         XR CHEST PORTABLE   Final Result   1.  No acute abnormality.                 Gwendolyn Paz MD      Please excuse brevity and/or typos. This report was transcribed using voice recognition software. Every effort was made to ensure accuracy, however, inadvertent computerized transcription errors may be present.        
chair alarm in place, call light within reach, patient at risk for falls, nurse notified, and family/caregiver present  Recommend Maxi-move for OOB activity - discussed with nursing    Plan  Frequency: 5-7 x/week  Current Treatment Recommendations: strengthening, balance training, functional mobility training, transfer training, gait training, endurance training, home exercise program, and safety education    Goals  Patient Goals: did not state   Short Term Goals:  Time Frame: discharge  Patient will complete bed mobility at minimal assistance   Patient will complete transfers at maximum assistance   Patient to maintain standing at maximum assistance for 1-2 minutes.    Above goals reviewed on 10/6/2024.  All goals are ongoing at this time unless indicated above.      Therapy Session Time      Individual Group Co-treatment   Time In      0838   Time Out      0921   Minutes      43     Timed Code Treatment Minutes:   43  Total Treatment Minutes:  43       Electronically Signed By: Jim Valles, PT  Jim Valles PT, DPT, [id number]          
safety education    Goals  Patient Goals: did not state   Short Term Goals:  Time Frame: discharge  Patient will complete bed mobility at minimal assistance   Patient will complete transfers at maximum assistance   Patient to maintain standing at maximum assistance for 1-2 minutes.    Above goals reviewed on 10/9/2024.  All goals are ongoing at this time unless indicated above.      Therapy Session Time      Individual Group Co-treatment   Time In     0914   Time Out     1000   Minutes     46     Timed Code Treatment Minutes:  46 Minutes  Total Treatment Minutes:  46 minutes       Electronically Signed By: Brooke Crandall PT, DPT [id number]      
tissue swelling.  The bone marrow signal intensity is unremarkable.  There are degenerative changes at C1-C2 impinging along the ventral margin of the medulla/cervicomedullary junction.     1. Unremarkable MRI of the brain.     MRI CERVICAL SPINE WO CONTRAST    Result Date: 10/3/2024  EXAMINATION: MRI OF THE CERVICAL SPINE WITHOUT CONTRAST 10/3/2024 10:31 am TECHNIQUE: Multiplanar multisequence MRI of the cervical spine was performed without the administration of intravenous contrast. COMPARISON: None HISTORY: ORDERING SYSTEM PROVIDED HISTORY: cannot walk numbness for two weeks TECHNOLOGIST PROVIDED HISTORY: Reason for exam:->cannot walk numbness for two weeks Decision Support Exception - unselect if not a suspected or confirmed emergency medical condition->Emergency Medical Condition (MA) FINDINGS: BONES/ALIGNMENT: There is straightening of the cervical spine with loss of the normal lordotic curvature. There is basilar invagination with C2 slightly impinging along the ventral margin of the cervicomedullary junction.  There are no areas of marrow edema to suggest an acute fracture. SPINAL CORD: The visualized posterior fossa structures are normal in appearance.  There are areas of increased T2 signal noted in the upper thoracic spinal cord, extending to the level of the C7-T1 disc space, incompletely imaged in the caudal extent.  This is concerning for transverse myelitis.  Postcontrast imaging may be of benefit to further evaluate this region. SOFT TISSUES: No paraspinal mass identified. C2-C3: There is a 2 mm broad-based central disc protrusion indenting the anterior thecal sac, flattening the ventral margin of the cervical spinal cord.  Moderate central spinal canal narrowing.  The neural foramina are patent. C3-C4: There is no significant disc protrusion, spinal canal stenosis or neural foraminal narrowing. C4-C5: There is no significant disc protrusion, spinal canal stenosis or neural foraminal narrowing. 
minutes.    Above goals reviewed on 10/10/2024.  All goals are ongoing at this time unless indicated above.      Therapy Session Time      Individual Group Co-treatment   Time In     0938   Time Out     1046   Minutes     68     Timed Code Treatment Minutes:  68 Minutes  Total Treatment Minutes:  68 minutes       Electronically Signed By: Jim Valles, PT, DPT, [id number]        
impingement upon the right S1 nerve root within the lateral.     Broad disc osteophyte complex at L5-S1 is eccentric to the right. There is impingement upon the right S1 nerve root within the lateral recess.     MRI THORACIC SPINE WO CONTRAST    Result Date: 10/3/2024  EXAMINATION: MRI OF THE THORACIC SPINE WITHOUT CONTRAST  10/3/2024 10:31 am TECHNIQUE: Multiplanar multisequence MRI of the thoracic spine was performed without the administration of intravenous contrast. COMPARISON: None HISTORY: ORDERING SYSTEM PROVIDED HISTORY: leg numbness, cannot walk TECHNOLOGIST PROVIDED HISTORY: Reason for exam:->leg numbness, cannot walk Decision Support Exception - unselect if not a suspected or confirmed emergency medical condition->Emergency Medical Condition (MA) Reason for Exam: pt unable to walk, leg numbness FINDINGS: BONES/ALIGNMENT: There is normal alignment of the spine. The vertebral body heights are maintained. The bone marrow signal appears unremarkable.  No acute fracture is identified. SPINAL CORD: Hyperintense T2 signal is identified within the thoracic cord extending from T1 through T6. no cord expansion is identified. SOFT TISSUES: No paraspinal mass identified. DEGENERATIVE CHANGES: No significant spinal canal stenosis or neural foraminal narrowing of the thoracic spine.     Hyperintense T2 signal within the thoracic cord extending from T1 through T6. Differential diagnosis includes transverse myelitis and demyelinating disease.  Neoplasm is not excluded.  MRI thoracic spine with contrast and CSF analysis are suggested for further evaluation.     XR CHEST PORTABLE    Result Date: 10/3/2024  EXAMINATION: ONE XRAY VIEW OF THE CHEST 10/3/2024 7:17 am COMPARISON: None available. HISTORY: ORDERING SYSTEM PROVIDED HISTORY: CP TECHNOLOGIST PROVIDED HISTORY: Reason for exam:->CP Reason for Exam: cp FINDINGS: The lungs are clear.  The right paratracheal stripe is thickened likely due to a right aortic arch.  The 
significant disc herniation, spinal canal stenosis or neural foraminal narrowing. L4-L5: Shallow disc bulge flattens the thecal sac. L5-S1: Broad disc osteophyte complex is eccentric to the right.  There is impingement upon the right S1 nerve root within the lateral.     Broad disc osteophyte complex at L5-S1 is eccentric to the right. There is impingement upon the right S1 nerve root within the lateral recess.     MRI THORACIC SPINE WO CONTRAST    Result Date: 10/3/2024  EXAMINATION: MRI OF THE THORACIC SPINE WITHOUT CONTRAST  10/3/2024 10:31 am TECHNIQUE: Multiplanar multisequence MRI of the thoracic spine was performed without the administration of intravenous contrast. COMPARISON: None HISTORY: ORDERING SYSTEM PROVIDED HISTORY: leg numbness, cannot walk TECHNOLOGIST PROVIDED HISTORY: Reason for exam:->leg numbness, cannot walk Decision Support Exception - unselect if not a suspected or confirmed emergency medical condition->Emergency Medical Condition (MA) Reason for Exam: pt unable to walk, leg numbness FINDINGS: BONES/ALIGNMENT: There is normal alignment of the spine. The vertebral body heights are maintained. The bone marrow signal appears unremarkable.  No acute fracture is identified. SPINAL CORD: Hyperintense T2 signal is identified within the thoracic cord extending from T1 through T6. no cord expansion is identified. SOFT TISSUES: No paraspinal mass identified. DEGENERATIVE CHANGES: No significant spinal canal stenosis or neural foraminal narrowing of the thoracic spine.     Hyperintense T2 signal within the thoracic cord extending from T1 through T6. Differential diagnosis includes transverse myelitis and demyelinating disease.  Neoplasm is not excluded.  MRI thoracic spine with contrast and CSF analysis are suggested for further evaluation.     XR CHEST PORTABLE    Result Date: 10/3/2024  EXAMINATION: ONE XRAY VIEW OF THE CHEST 10/3/2024 7:17 am COMPARISON: None available. HISTORY: ORDERING SYSTEM 
PROVIDED HISTORY: CP TECHNOLOGIST PROVIDED HISTORY: Reason for exam:->CP Reason for Exam: cp FINDINGS: The lungs are clear.  The right paratracheal stripe is thickened likely due to a right aortic arch.  The cardiac silhouette is within normal limits. There is no pneumothorax or pleural effusion.     1.  No acute abnormality.       CBC:   No results for input(s): \"WBC\", \"HGB\", \"PLT\" in the last 72 hours.    BMP:    No results for input(s): \"NA\", \"K\", \"CL\", \"CO2\", \"BUN\", \"CREATININE\", \"GLUCOSE\" in the last 72 hours.    Hepatic:   No results for input(s): \"AST\", \"ALT\", \"BILITOT\", \"ALKPHOS\" in the last 72 hours.    Invalid input(s): \"ALB\"    Lipids: No results found for: \"CHOL\", \"HDL\", \"TRIG\"  Hemoglobin A1C: No results found for: \"LABA1C\"  TSH:   Lab Results   Component Value Date/Time    TSH 1.21 10/03/2024 03:53 PM     Troponin: No results found for: \"TROPONINT\"  Lactic Acid: No results for input(s): \"LACTA\" in the last 72 hours.  BNP: No results for input(s): \"PROBNP\" in the last 72 hours.  UA:  Lab Results   Component Value Date/Time    NITRU Negative 10/03/2024 07:36 AM    COLORU DARK YELLOW 10/03/2024 07:36 AM    PHUR 7.0 10/03/2024 07:36 AM    PHUR 7.0 10/03/2024 07:36 AM    WBCUA 2 10/03/2024 07:36 AM    RBCUA 4 10/03/2024 07:36 AM    BACTERIA None Seen 10/03/2024 07:36 AM    CLARITYU Clear 10/03/2024 07:36 AM    LEUKOCYTESUR TRACE 10/03/2024 07:36 AM    UROBILINOGEN 1.0 10/03/2024 07:36 AM    BILIRUBINUR Negative 10/03/2024 07:36 AM    BLOODU Negative 10/03/2024 07:36 AM    GLUCOSEU Negative 10/03/2024 07:36 AM    KETUA TRACE 10/03/2024 07:36 AM     Urine Cultures: No results found for: \"LABURIN\"  Blood Cultures: No results found for: \"BC\"  No results found for: \"BLOODCULT2\"  Organism: No results found for: \"ORG\"      Electronically signed by Jenifer Mcdaniel MD on 10/6/2024 at 9:43 AM

## 2024-10-22 ENCOUNTER — HOSPITAL ENCOUNTER (EMERGENCY)
Age: 39
Discharge: HOME | End: 2024-10-23

## 2024-10-22 VITALS
BODY MASS INDEX: 27.34 KG/M2 | DIASTOLIC BLOOD PRESSURE: 79 MMHG | SYSTOLIC BLOOD PRESSURE: 112 MMHG | WEIGHT: 140 LBS | RESPIRATION RATE: 12 BRPM | OXYGEN SATURATION: 95 % | TEMPERATURE: 98.4 F | HEART RATE: 97 BPM

## 2024-10-22 DIAGNOSIS — N39.0 COMPLICATED UTI (URINARY TRACT INFECTION): Primary | ICD-10-CM

## 2024-10-22 LAB
ALBUMIN SERPL-MCNC: 4.5 G/DL (ref 3.4–5)
ALBUMIN/GLOB SERPL: 1.6 {RATIO} (ref 1.1–2.2)
ALP SERPL-CCNC: 77 U/L (ref 40–129)
ALT SERPL-CCNC: 27 U/L (ref 10–40)
ANION GAP SERPL CALCULATED.3IONS-SCNC: 12 MMOL/L (ref 3–16)
AST SERPL-CCNC: 19 U/L (ref 15–37)
BASOPHILS # BLD: 0 K/UL (ref 0–0.2)
BASOPHILS NFR BLD: 0.9 %
BILIRUB SERPL-MCNC: 0.4 MG/DL (ref 0–1)
BILIRUB UR QL STRIP.AUTO: NEGATIVE
BUN SERPL-MCNC: 9 MG/DL (ref 7–20)
CALCIUM SERPL-MCNC: 9.9 MG/DL (ref 8.3–10.6)
CHLORIDE SERPL-SCNC: 105 MMOL/L (ref 99–110)
CLARITY UR: ABNORMAL
CO2 SERPL-SCNC: 24 MMOL/L (ref 21–32)
COLOR UR: YELLOW
CREAT SERPL-MCNC: 0.5 MG/DL (ref 0.6–1.1)
DEPRECATED RDW RBC AUTO: 13.1 % (ref 12.4–15.4)
EOSINOPHIL # BLD: 0.1 K/UL (ref 0–0.6)
EOSINOPHIL NFR BLD: 2 %
EPI CELLS #/AREA URNS HPF: NORMAL /HPF (ref 0–5)
GFR SERPLBLD CREATININE-BSD FMLA CKD-EPI: >90 ML/MIN/{1.73_M2}
GLUCOSE SERPL-MCNC: 92 MG/DL (ref 70–99)
GLUCOSE UR STRIP.AUTO-MCNC: NEGATIVE MG/DL
HCG UR QL: NEGATIVE
HCT VFR BLD AUTO: 43.4 % (ref 36–48)
HGB BLD-MCNC: 14.6 G/DL (ref 12–16)
HGB UR QL STRIP.AUTO: ABNORMAL
KETONES UR STRIP.AUTO-MCNC: NEGATIVE MG/DL
LEUKOCYTE ESTERASE UR QL STRIP.AUTO: ABNORMAL
LYMPHOCYTES # BLD: 1.3 K/UL (ref 1–5.1)
LYMPHOCYTES NFR BLD: 28.1 %
MAGNESIUM SERPL-MCNC: 1.96 MG/DL (ref 1.8–2.4)
MCH RBC QN AUTO: 30.4 PG (ref 26–34)
MCHC RBC AUTO-ENTMCNC: 33.5 G/DL (ref 31–36)
MCV RBC AUTO: 90.7 FL (ref 80–100)
MONOCYTES # BLD: 0.4 K/UL (ref 0–1.3)
MONOCYTES NFR BLD: 8.4 %
NEUTROPHILS # BLD: 2.8 K/UL (ref 1.7–7.7)
NEUTROPHILS NFR BLD: 60.6 %
NITRITE UR QL STRIP.AUTO: NEGATIVE
PH UR STRIP.AUTO: 7.5 [PH] (ref 5–8)
PLATELET # BLD AUTO: 150 K/UL (ref 135–450)
PMV BLD AUTO: 9.1 FL (ref 5–10.5)
POTASSIUM SERPL-SCNC: 3.5 MMOL/L (ref 3.5–5.1)
PROT SERPL-MCNC: 7.4 G/DL (ref 6.4–8.2)
PROT UR STRIP.AUTO-MCNC: 100 MG/DL
RBC # BLD AUTO: 4.79 M/UL (ref 4–5.2)
RBC #/AREA URNS HPF: NORMAL /HPF (ref 0–4)
SODIUM SERPL-SCNC: 141 MMOL/L (ref 136–145)
SP GR UR STRIP.AUTO: <=1.005 (ref 1–1.03)
UA COMPLETE W REFLEX CULTURE PNL UR: ABNORMAL
UA DIPSTICK W REFLEX MICRO PNL UR: YES
URN SPEC COLLECT METH UR: ABNORMAL
UROBILINOGEN UR STRIP-ACNC: 1 E.U./DL
WBC # BLD AUTO: 4.7 K/UL (ref 4–11)
WBC #/AREA URNS HPF: NORMAL /HPF (ref 0–5)

## 2024-10-22 PROCEDURE — 80053 COMPREHEN METABOLIC PANEL: CPT

## 2024-10-22 PROCEDURE — 99283 EMERGENCY DEPT VISIT LOW MDM: CPT

## 2024-10-22 PROCEDURE — 84703 CHORIONIC GONADOTROPIN ASSAY: CPT

## 2024-10-22 PROCEDURE — 6370000000 HC RX 637 (ALT 250 FOR IP): Performed by: PHYSICIAN ASSISTANT

## 2024-10-22 PROCEDURE — 83735 ASSAY OF MAGNESIUM: CPT

## 2024-10-22 PROCEDURE — 81001 URINALYSIS AUTO W/SCOPE: CPT

## 2024-10-22 PROCEDURE — 85025 COMPLETE CBC W/AUTO DIFF WBC: CPT

## 2024-10-22 RX ORDER — CEPHALEXIN 500 MG/1
500 CAPSULE ORAL 4 TIMES DAILY
Qty: 40 CAPSULE | Refills: 0 | Status: SHIPPED | OUTPATIENT
Start: 2024-10-22 | End: 2024-11-01

## 2024-10-22 RX ADMIN — CEPHALEXIN 500 MG: 250 CAPSULE ORAL at 21:19

## 2024-10-22 NOTE — ED PROVIDER NOTES
Clinician of Record.  FINAL IMPRESSION      1. Complicated UTI (urinary tract infection)          DISPOSITION/PLAN     DISPOSITION Decision To Discharge 10/22/2024 08:57:04 PM           PATIENT REFERRED TO:  Your appointment scheduled on 11/1    Call   As needed    [facility] Emergency Department  [address] [place] [address]  [phone]    As needed      DISCHARGE MEDICATIONS:  New Prescriptions    CEPHALEXIN (KEFLEX) 500 MG CAPSULE    Take 1 capsule by mouth 4 times daily for 10 days       DISCONTINUED MEDICATIONS:  Discontinued Medications    No medications on file              (Please note that portions of this note were completed with a voice recognition program.  Efforts were made to edit the dictations but occasionally words are mis-transcribed.)    You Garcia PA-C (electronically signed)        You Garcia PA-C  10/22/24 [id number]

## 2024-10-22 NOTE — PROGRESS NOTES
used to:    Patient oriented to room and ED throughput process.  Safety measures with ED bed locked in lowest position and call light in reach.  Patient educated on all orders, including any medications.  Patient educated on chief complaint/symptoms. Patient encouraged to ask questions regarding care, medications or treatment plan.  Patient aware of how to reach staff with questions/concerns.

## 2024-10-23 NOTE — CONSULTS
Session ID: [id number]  Language: Estonian   ID: #[id number]   Name: Rui Post-Care Instructions: I reviewed with the patient in detail post-care instructions. Patient is to wear sunprotection, and avoid picking at any of the treated lesions. Pt may apply Vaseline to crusted or scabbing areas. Duration Of Freeze Thaw-Cycle (Seconds): 10 Render Post-Care Instructions In Note?: no Number Of Freeze-Thaw Cycles: 1 freeze-thaw cycle Detail Level: Simple Consent: The patient's consent was obtained including but not limited to risks of crusting, scabbing, blistering, scarring, darker or lighter pigmentary change, recurrence, incomplete removal and infection.

## 2024-10-23 NOTE — PROGRESS NOTES
Patient provided with discharge instructions, discussed in detail, new medications reviewed including use and side effects. Extensive olguin teaching given as well as catheter care. Demonstration provided regarding care. Patient and  who is at bedside  verbalized understanding.  Prescriptions filled per outpatient pharmacy.

## 2024-11-16 ENCOUNTER — HOSPITAL ENCOUNTER (EMERGENCY)
Age: 39
Discharge: HOME | End: 2024-11-16

## 2024-11-16 VITALS
WEIGHT: 140 LBS | SYSTOLIC BLOOD PRESSURE: 108 MMHG | TEMPERATURE: 98.1 F | DIASTOLIC BLOOD PRESSURE: 67 MMHG | BODY MASS INDEX: 27.48 KG/M2 | RESPIRATION RATE: 16 BRPM | OXYGEN SATURATION: 100 % | HEART RATE: 98 BPM | HEIGHT: 60 IN

## 2024-11-16 DIAGNOSIS — R29.898 BILATERAL LEG WEAKNESS: Primary | ICD-10-CM

## 2024-11-16 DIAGNOSIS — Z97.8 FOLEY CATHETER IN PLACE: ICD-10-CM

## 2024-11-16 PROCEDURE — 99283 EMERGENCY DEPT VISIT LOW MDM: CPT

## 2024-11-16 ASSESSMENT — ENCOUNTER SYMPTOMS
STRIDOR: 0
DIARRHEA: 0
COLOR CHANGE: 0
SHORTNESS OF BREATH: 0
ABDOMINAL PAIN: 0
COUGH: 0
WHEEZING: 0
ABDOMINAL DISTENTION: 0
BACK PAIN: 0
CONSTIPATION: 0
VOMITING: 0
NAUSEA: 0

## 2024-11-16 ASSESSMENT — LIFESTYLE VARIABLES
HOW MANY STANDARD DRINKS CONTAINING ALCOHOL DO YOU HAVE ON A TYPICAL DAY: PATIENT DOES NOT DRINK
HOW OFTEN DO YOU HAVE A DRINK CONTAINING ALCOHOL: NEVER

## 2024-11-16 ASSESSMENT — PAIN - FUNCTIONAL ASSESSMENT: PAIN_FUNCTIONAL_ASSESSMENT: NONE - DENIES PAIN

## 2024-11-16 NOTE — DISCHARGE INSTRUCTIONS
Contact   Department of  Neurology and Rehabilitation Medicine  [facility] [address] [place] [address]  Mail Location: [facility]  Academic Phone: [phone]  Academic Fax: [phone]  Academic Email: [email or web address]  Clinic Phone: [phone]  Clinic Fax: [phone]

## 2024-11-16 NOTE — ED PROVIDER NOTES
[facility] EMERGENCY DEPARTMENT  EMERGENCY DEPARTMENT ENCOUNTER        Pt Name: Shelly Childs  MRN: [id number]  Birthdate 1985  Date of evaluation: 11/16/2024  Provider: Bart Vu PA-C  PCP: No primary care provider on file.  Note Started: 1:41 PM EST 11/16/24      FARA. I have evaluated this patient.        CHIEF COMPLAINT       Chief Complaint   Patient presents with    urinary cath issue      Pt arrived from home with family. Pt states she would like urinary cath removed now that she is able to get around. Pt states she was in hospital for two weeks with weakness and had cath because she was unable to get up and now is steady on feet.       HISTORY OF PRESENT ILLNESS: 1 or more Elements     History from : Patient and Family      Limitations to history : Language [facility] [id number]    Shelly Childs is a 39 y.o. female who presents to the emergency department with family at bedside.  She has been primarily wheelchair-bound since her recent diagnosis of transverse myelitis last month.  Since then, she states that her bilateral leg weakness has improved however is still unable to walk.  She has an indwelling Landeros catheter.  She has normal output from her Landeros catheter and denies any hematuria, dysuria, frequency, urgency, suprapubic pain fever or chills.  She was simply hoping for the Landeros catheter to be removed.  However, in review of her chart on 10/10/2024, she was supposed to follow-up with  neurology clinic that specializes in multiple sclerosis.  She claims that she misunderstood the instructions.    Nursing Notes were all reviewed and agreed with or any disagreements were addressed in the HPI.    REVIEW OF SYSTEMS :      Review of Systems   Constitutional:  Negative for chills and fever.   HENT: Negative.     Eyes:  Negative for visual disturbance.   Respiratory:  Negative for cough, shortness of breath, wheezing and stridor.    Cardiovascular:  Negative  shingles, or other concerning pathology.    Appropriate for outpatient management        I am the Primary Clinician of Record.    FINAL IMPRESSION      1. Bilateral leg weakness    2. Landeros catheter in place          DISPOSITION/PLAN     DISPOSITION Decision To Discharge 11/16/2024 01:32:15 PM           PATIENT REFERRED TO:  YOU MUST FOLLOW UP WITH  NEUROLOGY MULTIPLE SCLEROSIS CLINIC          [facility]  [address] [facility] [address] [place] [address]  [phone]          DISCHARGE MEDICATIONS:  New Prescriptions    No medications on file       DISCONTINUED MEDICATIONS:  Discontinued Medications    No medications on file              (Please note that portions of this note were completed with a voice recognition program.  Efforts were made to edit the dictations but occasionally words are mis-transcribed.)    Bart Vu PA-C (electronically signed)           Bart Vu PA-C  11/16/24 [id number]

## 2024-11-16 NOTE — CONSULTS
Session ID: [id number]  Language: [facility]   ID: #[id number]   Name: [place]: [place]   ID: #[id number]   Name: Nikky

## 2024-12-04 ENCOUNTER — HOSPITAL ENCOUNTER (EMERGENCY)
Age: 39
Discharge: LEFT BEFORE DISCHARGE | End: 2024-12-04
Attending: EMERGENCY MEDICINE

## 2024-12-04 VITALS
WEIGHT: 140 LBS | HEART RATE: 86 BPM | BODY MASS INDEX: 27.48 KG/M2 | HEIGHT: 60 IN | DIASTOLIC BLOOD PRESSURE: 68 MMHG | TEMPERATURE: 99.9 F | OXYGEN SATURATION: 97 % | SYSTOLIC BLOOD PRESSURE: 106 MMHG | RESPIRATION RATE: 14 BRPM

## 2024-12-04 DIAGNOSIS — R26.2 INABILITY TO WALK: ICD-10-CM

## 2024-12-04 DIAGNOSIS — G37.3 TRANSVERSE MYELITIS (HCC): Primary | ICD-10-CM

## 2024-12-04 DIAGNOSIS — N30.00 ACUTE CYSTITIS WITHOUT HEMATURIA: ICD-10-CM

## 2024-12-04 DIAGNOSIS — Z78.9 UNABLE TO CARE FOR SELF: ICD-10-CM

## 2024-12-04 LAB
ALBUMIN SERPL-MCNC: 4.6 G/DL (ref 3.4–5)
ALBUMIN/GLOB SERPL: 1.4 {RATIO} (ref 1.1–2.2)
ALP SERPL-CCNC: 77 U/L (ref 40–129)
ALT SERPL-CCNC: 36 U/L (ref 10–40)
ANION GAP SERPL CALCULATED.3IONS-SCNC: 16 MMOL/L (ref 3–16)
AST SERPL-CCNC: 24 U/L (ref 15–37)
BACTERIA URNS QL MICRO: ABNORMAL /HPF
BASOPHILS # BLD: 0 K/UL (ref 0–0.2)
BASOPHILS NFR BLD: 0.4 %
BILIRUB SERPL-MCNC: 0.5 MG/DL (ref 0–1)
BILIRUB UR QL STRIP.AUTO: NEGATIVE
BUN SERPL-MCNC: 3 MG/DL (ref 7–20)
CALCIUM SERPL-MCNC: 10.1 MG/DL (ref 8.3–10.6)
CHLORIDE SERPL-SCNC: 103 MMOL/L (ref 99–110)
CK SERPL-CCNC: 55 U/L (ref 26–192)
CLARITY UR: ABNORMAL
CO2 SERPL-SCNC: 22 MMOL/L (ref 21–32)
COLOR UR: YELLOW
CREAT SERPL-MCNC: 0.5 MG/DL (ref 0.6–1.1)
CRP SERPL-MCNC: <3 MG/L (ref 0–5.1)
DEPRECATED RDW RBC AUTO: 13.2 % (ref 12.4–15.4)
EOSINOPHIL # BLD: 0 K/UL (ref 0–0.6)
EOSINOPHIL NFR BLD: 0.2 %
EPI CELLS #/AREA URNS AUTO: 6 /HPF (ref 0–5)
ERYTHROCYTE [SEDIMENTATION RATE] IN BLOOD BY WESTERGREN METHOD: 31 MM/HR (ref 0–20)
GFR SERPLBLD CREATININE-BSD FMLA CKD-EPI: >90 ML/MIN/{1.73_M2}
GLUCOSE SERPL-MCNC: 110 MG/DL (ref 70–99)
GLUCOSE UR STRIP.AUTO-MCNC: NEGATIVE MG/DL
HCG SERPL QL: NEGATIVE
HCT VFR BLD AUTO: 41.4 % (ref 36–48)
HGB BLD-MCNC: 14.5 G/DL (ref 12–16)
HGB UR QL STRIP.AUTO: NEGATIVE
HYALINE CASTS #/AREA URNS AUTO: 0 /LPF (ref 0–8)
KETONES UR STRIP.AUTO-MCNC: NEGATIVE MG/DL
LACTATE BLDV-SCNC: 1.1 MMOL/L (ref 0.4–1.9)
LACTATE BLDV-SCNC: 2.3 MMOL/L (ref 0.4–1.9)
LEUKOCYTE ESTERASE UR QL STRIP.AUTO: ABNORMAL
LIPASE SERPL-CCNC: 46 U/L (ref 13–60)
LYMPHOCYTES # BLD: 2.2 K/UL (ref 1–5.1)
LYMPHOCYTES NFR BLD: 26.2 %
MCH RBC QN AUTO: 31.8 PG (ref 26–34)
MCHC RBC AUTO-ENTMCNC: 35 G/DL (ref 31–36)
MCV RBC AUTO: 90.8 FL (ref 80–100)
MONOCYTES # BLD: 0.7 K/UL (ref 0–1.3)
MONOCYTES NFR BLD: 8.9 %
NEUTROPHILS # BLD: 5.4 K/UL (ref 1.7–7.7)
NEUTROPHILS NFR BLD: 64.3 %
NITRITE UR QL STRIP.AUTO: NEGATIVE
PH UR STRIP.AUTO: 7.5 [PH] (ref 5–8)
PLATELET # BLD AUTO: 250 K/UL (ref 135–450)
PMV BLD AUTO: 8.9 FL (ref 5–10.5)
POTASSIUM SERPL-SCNC: 3.6 MMOL/L (ref 3.5–5.1)
PROT SERPL-MCNC: 8 G/DL (ref 6.4–8.2)
PROT UR STRIP.AUTO-MCNC: NEGATIVE MG/DL
RBC # BLD AUTO: 4.56 M/UL (ref 4–5.2)
RBC CLUMPS #/AREA URNS AUTO: 1 /HPF (ref 0–4)
SODIUM SERPL-SCNC: 141 MMOL/L (ref 136–145)
SP GR UR STRIP.AUTO: 1.01 (ref 1–1.03)
UA COMPLETE W REFLEX CULTURE PNL UR: YES
UA DIPSTICK W REFLEX MICRO PNL UR: YES
URN SPEC COLLECT METH UR: ABNORMAL
UROBILINOGEN UR STRIP-ACNC: 1 E.U./DL
WBC # BLD AUTO: 8.4 K/UL (ref 4–11)
WBC #/AREA URNS AUTO: 88 /HPF (ref 0–5)

## 2024-12-04 PROCEDURE — 87186 SC STD MICRODIL/AGAR DIL: CPT

## 2024-12-04 PROCEDURE — 86140 C-REACTIVE PROTEIN: CPT

## 2024-12-04 PROCEDURE — 80053 COMPREHEN METABOLIC PANEL: CPT

## 2024-12-04 PROCEDURE — 85652 RBC SED RATE AUTOMATED: CPT

## 2024-12-04 PROCEDURE — 99284 EMERGENCY DEPT VISIT MOD MDM: CPT

## 2024-12-04 PROCEDURE — 2580000003 HC RX 258: Performed by: PHYSICIAN ASSISTANT

## 2024-12-04 PROCEDURE — 96374 THER/PROPH/DIAG INJ IV PUSH: CPT

## 2024-12-04 PROCEDURE — 87086 URINE CULTURE/COLONY COUNT: CPT

## 2024-12-04 PROCEDURE — 82550 ASSAY OF CK (CPK): CPT

## 2024-12-04 PROCEDURE — 96375 TX/PRO/DX INJ NEW DRUG ADDON: CPT

## 2024-12-04 PROCEDURE — 2580000003 HC RX 258: Performed by: EMERGENCY MEDICINE

## 2024-12-04 PROCEDURE — 6360000002 HC RX W HCPCS: Performed by: PHYSICIAN ASSISTANT

## 2024-12-04 PROCEDURE — 85025 COMPLETE CBC W/AUTO DIFF WBC: CPT

## 2024-12-04 PROCEDURE — 81001 URINALYSIS AUTO W/SCOPE: CPT

## 2024-12-04 PROCEDURE — 84703 CHORIONIC GONADOTROPIN ASSAY: CPT

## 2024-12-04 PROCEDURE — 83605 ASSAY OF LACTIC ACID: CPT

## 2024-12-04 PROCEDURE — 87077 CULTURE AEROBIC IDENTIFY: CPT

## 2024-12-04 PROCEDURE — 83690 ASSAY OF LIPASE: CPT

## 2024-12-04 RX ORDER — MORPHINE SULFATE 4 MG/ML
4 INJECTION, SOLUTION INTRAMUSCULAR; INTRAVENOUS ONCE
Status: COMPLETED | OUTPATIENT
Start: 2024-12-04 | End: 2024-12-04

## 2024-12-04 RX ORDER — ONDANSETRON 2 MG/ML
4 INJECTION INTRAMUSCULAR; INTRAVENOUS ONCE
Status: COMPLETED | OUTPATIENT
Start: 2024-12-04 | End: 2024-12-04

## 2024-12-04 RX ORDER — 0.9 % SODIUM CHLORIDE 0.9 %
1000 INTRAVENOUS SOLUTION INTRAVENOUS ONCE
Status: COMPLETED | OUTPATIENT
Start: 2024-12-04 | End: 2024-12-04

## 2024-12-04 RX ADMIN — SODIUM CHLORIDE 1000 ML: 9 INJECTION, SOLUTION INTRAVENOUS at 19:07

## 2024-12-04 RX ADMIN — MORPHINE SULFATE 4 MG: 4 INJECTION, SOLUTION INTRAMUSCULAR; INTRAVENOUS at 15:35

## 2024-12-04 RX ADMIN — WATER 1000 MG: 1 INJECTION INTRAMUSCULAR; INTRAVENOUS; SUBCUTANEOUS at 18:20

## 2024-12-04 RX ADMIN — ONDANSETRON 4 MG: 2 INJECTION, SOLUTION INTRAMUSCULAR; INTRAVENOUS at 15:35

## 2024-12-04 ASSESSMENT — ENCOUNTER SYMPTOMS
BACK PAIN: 1
NAUSEA: 0
ABDOMINAL PAIN: 1
VOMITING: 0
CHEST TIGHTNESS: 0
COUGH: 0
DIARRHEA: 0
SHORTNESS OF BREATH: 0

## 2024-12-04 ASSESSMENT — PAIN SCALES - GENERAL
PAINLEVEL_OUTOF10: 10
PAINLEVEL_OUTOF10: 8

## 2024-12-04 ASSESSMENT — PAIN - FUNCTIONAL ASSESSMENT: PAIN_FUNCTIONAL_ASSESSMENT: 0-10

## 2024-12-04 ASSESSMENT — PAIN DESCRIPTION - LOCATION
LOCATION: BACK
LOCATION: BACK

## 2024-12-04 NOTE — ED PROVIDER NOTES
In addition to the advanced practice provider, I personally saw Shelly Childs and performed a substantive portion of the visit including all aspects of the medical decision making.    Medical Decision Making  ***    Patient presents with worsening bilateral extremity weakness, back pain, difficulty caring for self, and urinary retention secondary to transverse myelitis diagnosed two months ago. She has been referred to  neurology and the MS clinic multiple times, but has not followed up.             Patient considering flight back to [facility] tomorrow because she is not getting better. I was very qamar with patient and her brother that her health and functional status is highly likely to decline further in [facility] as she needs extensive assistance, including PT/OT and subspecialists to treat and manage her condition. She has not yet followed up with . I plan to transfer there for admission due to her significant barriers to obtaining treatment (socioeconomic status, extreme language barrier, poor healthcare literacy, lack of access to community resources, etc.).     Extensive conversation with , brother, and patient for >45 minutes    I personally saw the patient and independently provided *** minutes of non-concurrent critical care out of the total shared critical care time provided.    Records Reviewed  ***  Case management 10/10/2024   (CM) called the following facilities asking if any one would accept patient on genevieve:     [facility]     All facilities have declined patient.     CM will speak to attending regarding different discharge disposition.    10/10/2024 OT  Discharge Recommendations: Shelly Childs scored a 13/24 on the AM-PAC ADL Inpatient form. Current research shows that an AM-PAC score of 17 or less is typically not associated with a discharge to the patient's home setting. Based on the 
Patient denies any new injury to her low back.  She denies changes in bowel or bladder habits, incontinence or saddle paresthesia.  Patient is mostly bedbound at this point.  Patient states she does feel the back pain radiates around to her abdomen at times.  She denies nausea, vomiting, diarrhea or constipation.  She states her Landeros catheter \"fell out\" 1 week ago.  She denies dysuria or hematuria at this time.  She has no vaginal complaints.  She denies fever or chills however temperature is 99.9 °F on arrival.  She denies chest pain or shortness of breath.  She denies headache, lightheadedness or dizziness.    Patient does have mild tenderness on palpation across the lumbar spine and paraspinous muscles bilaterally.  She is able to move both of her lower extremities but moves very slowly and expresses pain with this.  She does have 5/5 motor strength with plantarflexion and dorsiflexion.  There were several times throughout my exam that she began having cramping in her low back and legs bilaterally.  This appeared very painful.  Patient denies sensory deficits at the time of my exam.  She has 2+ distal pulses.  Capillary refill <2 seconds.    CBC, BMP, LFTs and lipase are unremarkable.  Urine analysis does show signs of infection.  Qualitative hCG is negative.  CK and CRP are unremarkable.  ESR slightly elevated at 31.  Lactate is pending.    Patient started on Rocephin for UTI.  She is given pain and nausea medication          As this is the end of my shift the attending physician will continue care of this patient. Shelly Childs was signed out in stable condition. Please see Rolan Meehan MD note for further details, including diagnosis and disposition.        FINAL IMPRESSION      1. Acute cystitis without hematuria    2. Transverse myelitis (HCC)          DISPOSITION/PLAN     DISPOSITION                 PATIENT REFERRED TO:  No follow-up provider specified.    DISCHARGE MEDICATIONS:  New

## 2024-12-06 LAB
BACTERIA UR CULT: ABNORMAL
ORGANISM: ABNORMAL

## 2024-12-06 NOTE — ED NOTES
[facility]   Emergency Department Culture Follow-Up       Shelly Childs (CSN: [id number]) was seen and evaluated at [facility] Emergency Department on 12/4/24 by provider Dr. Meehan / Bennett Nash PA-C.    A urine culture was positive and is growing >100k E coli. Sensitivity results: sensitive to all except ampicillin and amp-sulbactam.      Treatment Course:    The patient was treated with ceftriaxone. It does not appear the patient was sent home with an antibiotic prescription.      Recommendation:    It is recommend to start cefuroxime 250mg PO BID x7 days.    This recommendation was reviewed with and agreed by ED provider Dr. Beth.    Follow-Up:    The patient's spouse was contacted and notified of the therapy change. The new prescription was called to [facility] on date: 12/6/24 by: Senait Jimenez. Pharmacy address: [address] [place] [address] Pharmacy phone number: [phone].    Thank you,    Senait Jimenez [facility]  12/6/2024